# Patient Record
Sex: MALE | Race: WHITE | Employment: OTHER | URBAN - METROPOLITAN AREA
[De-identification: names, ages, dates, MRNs, and addresses within clinical notes are randomized per-mention and may not be internally consistent; named-entity substitution may affect disease eponyms.]

---

## 2018-03-05 ENCOUNTER — HOSPITAL ENCOUNTER (OUTPATIENT)
Dept: PREADMISSION TESTING | Age: 83
Discharge: HOME | End: 2018-03-05
Payer: MEDICARE

## 2018-03-05 LAB
APPEARANCE UR: CLEAR
BACTERIA SPEC CULT: NORMAL
BILIRUB UR QL: NEGATIVE
COLOR UR: YELLOW
GLUCOSE UR STRIP.AUTO-MCNC: NEGATIVE MG/DL
HGB UR QL STRIP: NEGATIVE
KETONES UR QL STRIP.AUTO: NEGATIVE MG/DL
LEUKOCYTE ESTERASE UR QL STRIP.AUTO: NEGATIVE
NITRITE UR QL STRIP.AUTO: NEGATIVE
PH UR STRIP: 7.5 [PH] (ref 5–8)
PROT UR STRIP-MCNC: NEGATIVE MG/DL
SERVICE CMNT-IMP: NORMAL
SP GR UR REFRACTOMETRY: 1.01 (ref 1–1.03)
UROBILINOGEN UR QL STRIP.AUTO: 0.2 EU/DL (ref 0.2–1)

## 2018-03-05 PROCEDURE — 93005 ELECTROCARDIOGRAM TRACING: CPT

## 2018-03-05 PROCEDURE — 81003 URINALYSIS AUTO W/O SCOPE: CPT | Performed by: ORTHOPAEDIC SURGERY

## 2018-03-05 PROCEDURE — 87641 MR-STAPH DNA AMP PROBE: CPT | Performed by: ORTHOPAEDIC SURGERY

## 2018-03-05 RX ORDER — ACETAMINOPHEN 325 MG/1
650 TABLET ORAL
Status: ON HOLD | COMMUNITY
End: 2018-03-22

## 2018-03-05 RX ORDER — CEFAZOLIN SODIUM 2 G/50ML
2 SOLUTION INTRAVENOUS ONCE
Status: CANCELLED | OUTPATIENT
Start: 2018-03-05 | End: 2018-03-05

## 2018-03-05 RX ORDER — CETIRIZINE HCL 10 MG
10 TABLET ORAL
COMMUNITY

## 2018-03-05 NOTE — PERIOP NOTES
No sleep apnea or previous sleep studies. No history of MH. PCP is aware of surgery. Care fusion instructions reviewed and kit given. Does  meet criteria for special population at this time, posting notified. Not participating in clinical trials or research study. Post lung transplant, records from [address]ont are in [address] [place] [address]. Clearances obtained from Pulmonary, Nephrology, and PCP to be faxed from Dr Patricia Holliday office for review.

## 2018-03-06 LAB
ATRIAL RATE: 68 BPM
CALCULATED P AXIS, ECG09: 0 DEGREES
CALCULATED R AXIS, ECG10: 15 DEGREES
CALCULATED T AXIS, ECG11: 89 DEGREES
DIAGNOSIS, 93000: NORMAL
P-R INTERVAL, ECG05: 152 MS
Q-T INTERVAL, ECG07: 394 MS
QRS DURATION, ECG06: 82 MS
QTC CALCULATION (BEZET), ECG08: 418 MS
VENTRICULAR RATE, ECG03: 68 BPM

## 2018-03-16 NOTE — PERIOP NOTES
Eleazar Lopez for update on clearance as noted by anesthesia. (had spoken to anesthesia and Laurel Ramirez this am)  Laurel Ramirez stated Dr. Sheldon Stanley called anesthesia and spoke to Dr. Chantell Turner. She stated it was agreed between Dr. Chantell Turner and Dr. Sheldon Stanley that they had all documentation to proceed.

## 2018-03-18 ENCOUNTER — ANESTHESIA EVENT (OUTPATIENT)
Dept: SURGERY | Age: 83
DRG: 462 | End: 2018-03-18
Payer: MEDICARE

## 2018-03-19 ENCOUNTER — HOSPITAL ENCOUNTER (INPATIENT)
Age: 83
LOS: 3 days | Discharge: HOME | DRG: 462 | End: 2018-03-22
Attending: ORTHOPAEDIC SURGERY | Admitting: ORTHOPAEDIC SURGERY
Payer: MEDICARE

## 2018-03-19 ENCOUNTER — ANESTHESIA (OUTPATIENT)
Dept: SURGERY | Age: 83
DRG: 462 | End: 2018-03-19
Payer: MEDICARE

## 2018-03-19 DIAGNOSIS — M15.9 PRIMARY OSTEOARTHRITIS INVOLVING MULTIPLE JOINTS: Primary | ICD-10-CM

## 2018-03-19 DIAGNOSIS — Z94.2 S/P LUNG TRANSPLANT (HCC): ICD-10-CM

## 2018-03-19 PROBLEM — M17.9 DJD (DEGENERATIVE JOINT DISEASE) OF KNEE: Status: ACTIVE | Noted: 2018-03-19

## 2018-03-19 LAB
ABO + RH BLD: NORMAL
APTT PPP: 26.4 SEC (ref 23–36.4)
BASOPHILS # BLD: 0 K/UL (ref 0–0.06)
BASOPHILS NFR BLD: 1 % (ref 0–2)
BLOOD GROUP ANTIBODIES SERPL: NORMAL
DIFFERENTIAL METHOD BLD: ABNORMAL
EOSINOPHIL # BLD: 0.1 K/UL (ref 0–0.4)
EOSINOPHIL NFR BLD: 1 % (ref 0–5)
ERYTHROCYTE [DISTWIDTH] IN BLOOD BY AUTOMATED COUNT: 17.6 % (ref 11.6–14.5)
HCT VFR BLD AUTO: 30.4 % (ref 36–48)
HGB BLD-MCNC: 9.2 G/DL (ref 13–16)
INR PPP: 1.1 (ref 0.8–1.2)
LYMPHOCYTES # BLD: 0.9 K/UL (ref 0.9–3.6)
LYMPHOCYTES NFR BLD: 11 % (ref 21–52)
MCH RBC QN AUTO: 26.3 PG (ref 24–34)
MCHC RBC AUTO-ENTMCNC: 30.3 G/DL (ref 31–37)
MCV RBC AUTO: 86.9 FL (ref 74–97)
MONOCYTES # BLD: 0.9 K/UL (ref 0.05–1.2)
MONOCYTES NFR BLD: 10 % (ref 3–10)
NEUTS SEG # BLD: 6.9 K/UL (ref 1.8–8)
NEUTS SEG NFR BLD: 77 % (ref 40–73)
PLATELET # BLD AUTO: 191 K/UL (ref 135–420)
PMV BLD AUTO: 11 FL (ref 9.2–11.8)
PROTHROMBIN TIME: 13.6 SEC (ref 11.5–15.2)
RBC # BLD AUTO: 3.5 M/UL (ref 4.7–5.5)
SPECIMEN EXP DATE BLD: NORMAL
WBC # BLD AUTO: 8.8 K/UL (ref 4.6–13.2)

## 2018-03-19 PROCEDURE — 97530 THERAPEUTIC ACTIVITIES: CPT

## 2018-03-19 PROCEDURE — 97162 PT EVAL MOD COMPLEX 30 MIN: CPT

## 2018-03-19 PROCEDURE — 74011250636 HC RX REV CODE- 250/636: Performed by: ORTHOPAEDIC SURGERY

## 2018-03-19 PROCEDURE — 77030003028 HC SUT VCRL J&J -A: Performed by: ORTHOPAEDIC SURGERY

## 2018-03-19 PROCEDURE — 77030014144 HC TY SPN ANES BBMI -B: Performed by: ANESTHESIOLOGY

## 2018-03-19 PROCEDURE — 77030020782 HC GWN BAIR PAWS FLX 3M -B: Performed by: ORTHOPAEDIC SURGERY

## 2018-03-19 PROCEDURE — 85610 PROTHROMBIN TIME: CPT | Performed by: ANESTHESIOLOGY

## 2018-03-19 PROCEDURE — 74011250636 HC RX REV CODE- 250/636

## 2018-03-19 PROCEDURE — 77030005521 HC CATH URETH FOL38 BARD -B: Performed by: ORTHOPAEDIC SURGERY

## 2018-03-19 PROCEDURE — 77030031118: Performed by: ORTHOPAEDIC SURGERY

## 2018-03-19 PROCEDURE — 74011000250 HC RX REV CODE- 250

## 2018-03-19 PROCEDURE — 36415 COLL VENOUS BLD VENIPUNCTURE: CPT | Performed by: ANESTHESIOLOGY

## 2018-03-19 PROCEDURE — C9290 INJ, BUPIVACAINE LIPOSOME: HCPCS | Performed by: ORTHOPAEDIC SURGERY

## 2018-03-19 PROCEDURE — 76010000131 HC OR TIME 2 TO 2.5 HR: Performed by: ORTHOPAEDIC SURGERY

## 2018-03-19 PROCEDURE — 0SRC0J9 REPLACEMENT OF RIGHT KNEE JOINT WITH SYNTHETIC SUBSTITUTE, CEMENTED, OPEN APPROACH: ICD-10-PCS | Performed by: ORTHOPAEDIC SURGERY

## 2018-03-19 PROCEDURE — C1713 ANCHOR/SCREW BN/BN,TIS/BN: HCPCS | Performed by: ORTHOPAEDIC SURGERY

## 2018-03-19 PROCEDURE — 77030036563 HC WRP CLD THER KNE S2SG -B: Performed by: ORTHOPAEDIC SURGERY

## 2018-03-19 PROCEDURE — C1776 JOINT DEVICE (IMPLANTABLE): HCPCS | Performed by: ORTHOPAEDIC SURGERY

## 2018-03-19 PROCEDURE — 77030022295 HC SEAL BPLR VSL DISP MEDT -F: Performed by: ORTHOPAEDIC SURGERY

## 2018-03-19 PROCEDURE — 77030002966 HC SUT PDS J&J -A: Performed by: ORTHOPAEDIC SURGERY

## 2018-03-19 PROCEDURE — 74011250636 HC RX REV CODE- 250/636: Performed by: ANESTHESIOLOGY

## 2018-03-19 PROCEDURE — 77030037400 HC ADH TISS HI VISC EXOFIN CHMP -B: Performed by: ORTHOPAEDIC SURGERY

## 2018-03-19 PROCEDURE — 86900 BLOOD TYPING SEROLOGIC ABO: CPT | Performed by: ORTHOPAEDIC SURGERY

## 2018-03-19 PROCEDURE — 77030020754 HC CUF TRNQT 2BLA STRY -B: Performed by: ORTHOPAEDIC SURGERY

## 2018-03-19 PROCEDURE — 77030018835 HC SOL IRR LR ICUM -A: Performed by: ORTHOPAEDIC SURGERY

## 2018-03-19 PROCEDURE — 65270000029 HC RM PRIVATE

## 2018-03-19 PROCEDURE — 76210000063 HC OR PH I REC FIRST 0.5 HR: Performed by: ORTHOPAEDIC SURGERY

## 2018-03-19 PROCEDURE — 77030038010: Performed by: ORTHOPAEDIC SURGERY

## 2018-03-19 PROCEDURE — 77030011628: Performed by: ORTHOPAEDIC SURGERY

## 2018-03-19 PROCEDURE — 0SRD0J9 REPLACEMENT OF LEFT KNEE JOINT WITH SYNTHETIC SUBSTITUTE, CEMENTED, OPEN APPROACH: ICD-10-PCS | Performed by: ORTHOPAEDIC SURGERY

## 2018-03-19 PROCEDURE — 77030008462 HC STPLR SKN PROX J&J -A: Performed by: ORTHOPAEDIC SURGERY

## 2018-03-19 PROCEDURE — 77030037875 HC DRSG MEPILEX <16IN BORD MOLN -A: Performed by: ORTHOPAEDIC SURGERY

## 2018-03-19 PROCEDURE — 77030034479 HC ADH SKN CLSR PRINEO J&J -B: Performed by: ORTHOPAEDIC SURGERY

## 2018-03-19 PROCEDURE — 77030020813 HC INST SCULP CEM KT DISP S&N -B: Performed by: ORTHOPAEDIC SURGERY

## 2018-03-19 PROCEDURE — 77030018836 HC SOL IRR NACL ICUM -A: Performed by: ORTHOPAEDIC SURGERY

## 2018-03-19 PROCEDURE — 74011000258 HC RX REV CODE- 258: Performed by: ORTHOPAEDIC SURGERY

## 2018-03-19 PROCEDURE — 77030011640 HC PAD GRND REM COVD -A: Performed by: ORTHOPAEDIC SURGERY

## 2018-03-19 PROCEDURE — 77010033678 HC OXYGEN DAILY

## 2018-03-19 PROCEDURE — 74011000250 HC RX REV CODE- 250: Performed by: ORTHOPAEDIC SURGERY

## 2018-03-19 PROCEDURE — 77030027138 HC INCENT SPIROMETER -A: Performed by: ORTHOPAEDIC SURGERY

## 2018-03-19 PROCEDURE — 76060000035 HC ANESTHESIA 2 TO 2.5 HR: Performed by: ORTHOPAEDIC SURGERY

## 2018-03-19 PROCEDURE — 85730 THROMBOPLASTIN TIME PARTIAL: CPT | Performed by: ANESTHESIOLOGY

## 2018-03-19 PROCEDURE — 85025 COMPLETE CBC W/AUTO DIFF WBC: CPT | Performed by: ANESTHESIOLOGY

## 2018-03-19 PROCEDURE — 74011250637 HC RX REV CODE- 250/637: Performed by: ORTHOPAEDIC SURGERY

## 2018-03-19 DEVICE — GENESIS II RESURFACING PATELLAR 35MM
Type: IMPLANTABLE DEVICE | Site: KNEE | Status: FUNCTIONAL
Brand: GENESIS II

## 2018-03-19 DEVICE — CEMENT BNE 20GM HALF DOSE PMMA VISC RADPQ FAST: Type: IMPLANTABLE DEVICE | Status: FUNCTIONAL

## 2018-03-19 DEVICE — JOURNEY PATELLO FEMORAL IMPLANT LG LT
Type: IMPLANTABLE DEVICE | Site: KNEE | Status: FUNCTIONAL
Brand: JOURNEY

## 2018-03-19 DEVICE — JOURNEY PATELLO FEMORAL  IMPLANT                                    LG RT
Type: IMPLANTABLE DEVICE | Site: KNEE | Status: FUNCTIONAL
Brand: JOURNEY

## 2018-03-19 DEVICE — COMPONENT FEM PAT PFJ L W OXINIUM AND RND: Type: IMPLANTABLE DEVICE | Status: FUNCTIONAL

## 2018-03-19 RX ORDER — ONDANSETRON 2 MG/ML
INJECTION INTRAMUSCULAR; INTRAVENOUS AS NEEDED
Status: DISCONTINUED | OUTPATIENT
Start: 2018-03-19 | End: 2018-03-19 | Stop reason: HOSPADM

## 2018-03-19 RX ORDER — DIPHENHYDRAMINE HCL 25 MG
25 CAPSULE ORAL
Status: DISCONTINUED | OUTPATIENT
Start: 2018-03-19 | End: 2018-03-22 | Stop reason: HOSPADM

## 2018-03-19 RX ORDER — SULFAMETHOXAZOLE AND TRIMETHOPRIM 800; 160 MG/1; MG/1
0.5 TABLET ORAL
Status: DISCONTINUED | OUTPATIENT
Start: 2018-03-19 | End: 2018-03-22 | Stop reason: HOSPADM

## 2018-03-19 RX ORDER — SODIUM CHLORIDE 0.9 % (FLUSH) 0.9 %
5-10 SYRINGE (ML) INJECTION EVERY 8 HOURS
Status: DISCONTINUED | OUTPATIENT
Start: 2018-03-19 | End: 2018-03-22 | Stop reason: HOSPADM

## 2018-03-19 RX ORDER — ACETAMINOPHEN 325 MG/1
650 TABLET ORAL
Status: DISCONTINUED | OUTPATIENT
Start: 2018-03-19 | End: 2018-03-22 | Stop reason: HOSPADM

## 2018-03-19 RX ORDER — PREDNISONE 5 MG/1
5 TABLET ORAL DAILY
Status: DISCONTINUED | OUTPATIENT
Start: 2018-03-20 | End: 2018-03-22 | Stop reason: HOSPADM

## 2018-03-19 RX ORDER — CEFAZOLIN SODIUM 2 G/50ML
2 SOLUTION INTRAVENOUS ONCE
Status: COMPLETED | OUTPATIENT
Start: 2018-03-19 | End: 2018-03-19

## 2018-03-19 RX ORDER — TRAMADOL HYDROCHLORIDE 50 MG/1
50 TABLET ORAL
Status: COMPLETED | OUTPATIENT
Start: 2018-03-19 | End: 2018-03-19

## 2018-03-19 RX ORDER — KETAMINE HYDROCHLORIDE 10 MG/ML
INJECTION, SOLUTION INTRAMUSCULAR; INTRAVENOUS AS NEEDED
Status: DISCONTINUED | OUTPATIENT
Start: 2018-03-19 | End: 2018-03-19 | Stop reason: HOSPADM

## 2018-03-19 RX ORDER — ENOXAPARIN SODIUM 100 MG/ML
40 INJECTION SUBCUTANEOUS DAILY
Status: DISCONTINUED | OUTPATIENT
Start: 2018-03-20 | End: 2018-03-20 | Stop reason: DRUGHIGH

## 2018-03-19 RX ORDER — NALOXONE HYDROCHLORIDE 0.4 MG/ML
0.1 INJECTION, SOLUTION INTRAMUSCULAR; INTRAVENOUS; SUBCUTANEOUS AS NEEDED
Status: DISCONTINUED | OUTPATIENT
Start: 2018-03-19 | End: 2018-03-19 | Stop reason: HOSPADM

## 2018-03-19 RX ORDER — SODIUM CHLORIDE 0.9 % (FLUSH) 0.9 %
5-10 SYRINGE (ML) INJECTION AS NEEDED
Status: DISCONTINUED | OUTPATIENT
Start: 2018-03-19 | End: 2018-03-22 | Stop reason: HOSPADM

## 2018-03-19 RX ORDER — FLUMAZENIL 0.1 MG/ML
0.2 INJECTION INTRAVENOUS
Status: DISCONTINUED | OUTPATIENT
Start: 2018-03-19 | End: 2018-03-19 | Stop reason: HOSPADM

## 2018-03-19 RX ORDER — DEXTROSE 50 % IN WATER (D50W) INTRAVENOUS SYRINGE
25-50 AS NEEDED
Status: DISCONTINUED | OUTPATIENT
Start: 2018-03-19 | End: 2018-03-19 | Stop reason: HOSPADM

## 2018-03-19 RX ORDER — MIDAZOLAM HYDROCHLORIDE 1 MG/ML
INJECTION, SOLUTION INTRAMUSCULAR; INTRAVENOUS AS NEEDED
Status: DISCONTINUED | OUTPATIENT
Start: 2018-03-19 | End: 2018-03-19 | Stop reason: HOSPADM

## 2018-03-19 RX ORDER — OXYCODONE HYDROCHLORIDE 5 MG/1
5 TABLET ORAL
Status: DISCONTINUED | OUTPATIENT
Start: 2018-03-19 | End: 2018-03-19

## 2018-03-19 RX ORDER — SODIUM CHLORIDE, SODIUM LACTATE, POTASSIUM CHLORIDE, CALCIUM CHLORIDE 600; 310; 30; 20 MG/100ML; MG/100ML; MG/100ML; MG/100ML
1000 INJECTION, SOLUTION INTRAVENOUS CONTINUOUS
Status: DISCONTINUED | OUTPATIENT
Start: 2018-03-19 | End: 2018-03-19 | Stop reason: HOSPADM

## 2018-03-19 RX ORDER — ATORVASTATIN CALCIUM 20 MG/1
20 TABLET, FILM COATED ORAL DAILY
Status: DISCONTINUED | OUTPATIENT
Start: 2018-03-20 | End: 2018-03-22 | Stop reason: HOSPADM

## 2018-03-19 RX ORDER — LORATADINE 10 MG/1
10 TABLET ORAL
Status: DISCONTINUED | OUTPATIENT
Start: 2018-03-19 | End: 2018-03-22 | Stop reason: HOSPADM

## 2018-03-19 RX ORDER — HYDROMORPHONE HYDROCHLORIDE 2 MG/ML
0.5 INJECTION, SOLUTION INTRAMUSCULAR; INTRAVENOUS; SUBCUTANEOUS
Status: DISCONTINUED | OUTPATIENT
Start: 2018-03-19 | End: 2018-03-19 | Stop reason: HOSPADM

## 2018-03-19 RX ORDER — ONDANSETRON 2 MG/ML
4 INJECTION INTRAMUSCULAR; INTRAVENOUS
Status: DISCONTINUED | OUTPATIENT
Start: 2018-03-19 | End: 2018-03-22 | Stop reason: HOSPADM

## 2018-03-19 RX ORDER — SODIUM CHLORIDE, SODIUM LACTATE, POTASSIUM CHLORIDE, CALCIUM CHLORIDE 600; 310; 30; 20 MG/100ML; MG/100ML; MG/100ML; MG/100ML
25 INJECTION, SOLUTION INTRAVENOUS CONTINUOUS
Status: DISPENSED | OUTPATIENT
Start: 2018-03-19 | End: 2018-03-20

## 2018-03-19 RX ORDER — DEXMEDETOMIDINE HYDROCHLORIDE 4 UG/ML
INJECTION, SOLUTION INTRAVENOUS AS NEEDED
Status: DISCONTINUED | OUTPATIENT
Start: 2018-03-19 | End: 2018-03-19 | Stop reason: HOSPADM

## 2018-03-19 RX ORDER — TRAMADOL HYDROCHLORIDE 50 MG/1
50 TABLET ORAL
Status: DISCONTINUED | OUTPATIENT
Start: 2018-03-19 | End: 2018-03-19

## 2018-03-19 RX ORDER — CEFAZOLIN SODIUM 2 G/50ML
2 SOLUTION INTRAVENOUS EVERY 8 HOURS
Status: COMPLETED | OUTPATIENT
Start: 2018-03-19 | End: 2018-03-20

## 2018-03-19 RX ORDER — PROPOFOL 10 MG/ML
INJECTION, EMULSION INTRAVENOUS
Status: DISCONTINUED | OUTPATIENT
Start: 2018-03-19 | End: 2018-03-19 | Stop reason: HOSPADM

## 2018-03-19 RX ORDER — DIPHENHYDRAMINE HYDROCHLORIDE 50 MG/ML
12.5 INJECTION, SOLUTION INTRAMUSCULAR; INTRAVENOUS
Status: DISCONTINUED | OUTPATIENT
Start: 2018-03-19 | End: 2018-03-22 | Stop reason: HOSPADM

## 2018-03-19 RX ORDER — FERROUS SULFATE, DRIED 160(50) MG
1 TABLET, EXTENDED RELEASE ORAL 2 TIMES DAILY
Status: DISCONTINUED | OUTPATIENT
Start: 2018-03-19 | End: 2018-03-22 | Stop reason: HOSPADM

## 2018-03-19 RX ORDER — PANTOPRAZOLE SODIUM 40 MG/1
40 TABLET, DELAYED RELEASE ORAL DAILY
Status: DISCONTINUED | OUTPATIENT
Start: 2018-03-20 | End: 2018-03-22 | Stop reason: HOSPADM

## 2018-03-19 RX ORDER — BUPIVACAINE HYDROCHLORIDE 7.5 MG/ML
INJECTION, SOLUTION INTRASPINAL AS NEEDED
Status: DISCONTINUED | OUTPATIENT
Start: 2018-03-19 | End: 2018-03-19 | Stop reason: HOSPADM

## 2018-03-19 RX ORDER — ZOLPIDEM TARTRATE 5 MG/1
5 TABLET ORAL
Status: DISCONTINUED | OUTPATIENT
Start: 2018-03-19 | End: 2018-03-22 | Stop reason: HOSPADM

## 2018-03-19 RX ORDER — LIDOCAINE HYDROCHLORIDE 20 MG/ML
INJECTION, SOLUTION EPIDURAL; INFILTRATION; INTRACAUDAL; PERINEURAL AS NEEDED
Status: DISCONTINUED | OUTPATIENT
Start: 2018-03-19 | End: 2018-03-19 | Stop reason: HOSPADM

## 2018-03-19 RX ORDER — INSULIN LISPRO 100 [IU]/ML
INJECTION, SOLUTION INTRAVENOUS; SUBCUTANEOUS ONCE
Status: DISCONTINUED | OUTPATIENT
Start: 2018-03-19 | End: 2018-03-19 | Stop reason: HOSPADM

## 2018-03-19 RX ORDER — PROPRANOLOL HYDROCHLORIDE 20 MG/1
20 TABLET ORAL 2 TIMES DAILY
Status: DISCONTINUED | OUTPATIENT
Start: 2018-03-19 | End: 2018-03-22 | Stop reason: HOSPADM

## 2018-03-19 RX ORDER — SODIUM CHLORIDE 0.9 % (FLUSH) 0.9 %
5-10 SYRINGE (ML) INJECTION AS NEEDED
Status: DISCONTINUED | OUTPATIENT
Start: 2018-03-19 | End: 2018-03-19 | Stop reason: HOSPADM

## 2018-03-19 RX ORDER — NALOXONE HYDROCHLORIDE 0.4 MG/ML
0.4 INJECTION, SOLUTION INTRAMUSCULAR; INTRAVENOUS; SUBCUTANEOUS AS NEEDED
Status: DISCONTINUED | OUTPATIENT
Start: 2018-03-19 | End: 2018-03-22 | Stop reason: HOSPADM

## 2018-03-19 RX ORDER — SODIUM CHLORIDE, SODIUM LACTATE, POTASSIUM CHLORIDE, CALCIUM CHLORIDE 600; 310; 30; 20 MG/100ML; MG/100ML; MG/100ML; MG/100ML
125 INJECTION, SOLUTION INTRAVENOUS CONTINUOUS
Status: DISCONTINUED | OUTPATIENT
Start: 2018-03-19 | End: 2018-03-19

## 2018-03-19 RX ORDER — MAGNESIUM SULFATE 100 %
4 CRYSTALS MISCELLANEOUS AS NEEDED
Status: DISCONTINUED | OUTPATIENT
Start: 2018-03-19 | End: 2018-03-19 | Stop reason: HOSPADM

## 2018-03-19 RX ORDER — TRAMADOL HYDROCHLORIDE 50 MG/1
50-100 TABLET ORAL
Status: DISCONTINUED | OUTPATIENT
Start: 2018-03-19 | End: 2018-03-22 | Stop reason: HOSPADM

## 2018-03-19 RX ORDER — TACROLIMUS 1 MG/1
1 CAPSULE ORAL
Status: DISCONTINUED | OUTPATIENT
Start: 2018-03-20 | End: 2018-03-22 | Stop reason: HOSPADM

## 2018-03-19 RX ORDER — VALGANCICLOVIR 450 MG/1
450 TABLET, FILM COATED ORAL
Status: DISCONTINUED | OUTPATIENT
Start: 2018-03-22 | End: 2018-03-22 | Stop reason: HOSPADM

## 2018-03-19 RX ORDER — LANOLIN ALCOHOL/MO/W.PET/CERES
400 CREAM (GRAM) TOPICAL DAILY
Status: DISCONTINUED | OUTPATIENT
Start: 2018-03-20 | End: 2018-03-22 | Stop reason: HOSPADM

## 2018-03-19 RX ORDER — TACROLIMUS 0.5 MG/1
CAPSULE ORAL EVERY 12 HOURS
Status: CANCELLED | OUTPATIENT
Start: 2018-03-19

## 2018-03-19 RX ORDER — HYDROMORPHONE HYDROCHLORIDE 2 MG/ML
1 INJECTION, SOLUTION INTRAMUSCULAR; INTRAVENOUS; SUBCUTANEOUS
Status: DISCONTINUED | OUTPATIENT
Start: 2018-03-19 | End: 2018-03-22 | Stop reason: HOSPADM

## 2018-03-19 RX ADMIN — PROPRANOLOL HYDROCHLORIDE 20 MG: 20 TABLET ORAL at 20:56

## 2018-03-19 RX ADMIN — ONDANSETRON 4 MG: 2 INJECTION INTRAMUSCULAR; INTRAVENOUS at 13:38

## 2018-03-19 RX ADMIN — CALCIUM CARBONATE-VITAMIN D TAB 500 MG-200 UNIT 1 TABLET: 500-200 TAB at 20:56

## 2018-03-19 RX ADMIN — KETAMINE HYDROCHLORIDE 20 MG: 10 INJECTION, SOLUTION INTRAMUSCULAR; INTRAVENOUS at 13:14

## 2018-03-19 RX ADMIN — MIDAZOLAM HYDROCHLORIDE 0.5 MG: 1 INJECTION, SOLUTION INTRAMUSCULAR; INTRAVENOUS at 12:42

## 2018-03-19 RX ADMIN — MIDAZOLAM HYDROCHLORIDE 1 MG: 1 INJECTION, SOLUTION INTRAMUSCULAR; INTRAVENOUS at 11:53

## 2018-03-19 RX ADMIN — TRAMADOL HYDROCHLORIDE 100 MG: 50 TABLET, FILM COATED ORAL at 22:05

## 2018-03-19 RX ADMIN — TRAMADOL HYDROCHLORIDE 50 MG: 50 TABLET, FILM COATED ORAL at 16:12

## 2018-03-19 RX ADMIN — TRAMADOL HYDROCHLORIDE 50 MG: 50 TABLET, FILM COATED ORAL at 18:07

## 2018-03-19 RX ADMIN — CEFAZOLIN SODIUM 2 G: 2 SOLUTION INTRAVENOUS at 11:59

## 2018-03-19 RX ADMIN — CEFAZOLIN SODIUM 2 G: 2 SOLUTION INTRAVENOUS at 19:49

## 2018-03-19 RX ADMIN — DEXMEDETOMIDINE HYDROCHLORIDE 4 MCG: 4 INJECTION, SOLUTION INTRAVENOUS at 13:12

## 2018-03-19 RX ADMIN — BUPIVACAINE HYDROCHLORIDE 1.5 ML: 7.5 INJECTION, SOLUTION INTRASPINAL at 12:00

## 2018-03-19 RX ADMIN — ACETAMINOPHEN 650 MG: 325 TABLET ORAL at 20:56

## 2018-03-19 RX ADMIN — SODIUM CHLORIDE, SODIUM LACTATE, POTASSIUM CHLORIDE, AND CALCIUM CHLORIDE 125 ML/HR: 600; 310; 30; 20 INJECTION, SOLUTION INTRAVENOUS at 11:36

## 2018-03-19 RX ADMIN — LIDOCAINE HYDROCHLORIDE 20 MG: 20 INJECTION, SOLUTION EPIDURAL; INFILTRATION; INTRACAUDAL; PERINEURAL at 12:13

## 2018-03-19 RX ADMIN — PROPOFOL 100 MCG/KG/MIN: 10 INJECTION, EMULSION INTRAVENOUS at 12:13

## 2018-03-19 RX ADMIN — MIDAZOLAM HYDROCHLORIDE 0.5 MG: 1 INJECTION, SOLUTION INTRAMUSCULAR; INTRAVENOUS at 11:58

## 2018-03-19 RX ADMIN — DEXMEDETOMIDINE HYDROCHLORIDE 8 MCG: 4 INJECTION, SOLUTION INTRAVENOUS at 13:15

## 2018-03-19 RX ADMIN — SODIUM CHLORIDE, SODIUM LACTATE, POTASSIUM CHLORIDE, AND CALCIUM CHLORIDE 50 ML/HR: 600; 310; 30; 20 INJECTION, SOLUTION INTRAVENOUS at 18:08

## 2018-03-19 RX ADMIN — HYDROCORTISONE SODIUM SUCCINATE 250 MG: 250 INJECTION, POWDER, FOR SOLUTION INTRAMUSCULAR; INTRAVENOUS at 11:31

## 2018-03-19 RX ADMIN — SODIUM CHLORIDE, SODIUM LACTATE, POTASSIUM CHLORIDE, AND CALCIUM CHLORIDE 125 ML/HR: 600; 310; 30; 20 INJECTION, SOLUTION INTRAVENOUS at 10:29

## 2018-03-19 NOTE — ANESTHESIA POSTPROCEDURE EVALUATION
Post-Anesthesia Evaluation & Assessment    Visit Vitals    /67    Pulse 62    Temp 36.3 °C (97.4 °F)    Resp 19    Ht 5' 9\" (1.753 m)    Wt 71.7 kg (158 lb)    SpO2 99%    BMI 23.33 kg/m2       No untreated/active PONV    Post-operative hydration adequate. Adequate post-operative analgesia per PACU discharge criteria    Mental status & level of consciousness: alert and oriented x 3    Respiratory status: patent unassisted airway     No apparent anesthetic complications requiring additional post-anesthetic care    Patient has met all discharge requirements.             Preeti Koehler MD

## 2018-03-19 NOTE — IP AVS SNAPSHOT
[address] [address] 
[phone] Patient: Janeen Paez MRN: [id number] A check leola indicates which time of day the medication should be taken. My Medications START taking these medications Instructions Each Dose to Equal  
 Morning Noon Evening Bedtime  
 promethazine 12.5 mg tablet Commonly known as:  PHENERGAN Your last dose was: Your next dose is: Take 2 Tabs by mouth every six (6) hours as needed for Nausea. 25 mg  
    
   
   
   
  
 traMADol 50 mg tablet Commonly known as:  ULTRAM  
   
Your last dose was: Your next dose is: Take 1-2 Tabs by mouth every six (6) hours as needed. Max Daily Amount: 400 mg.  
  mg CHANGE how you take these medications Instructions Each Dose to Equal  
 Morning Noon Evening Bedtime  
 acetaminophen 325 mg tablet Commonly known as:  TYLENOL What changed:   
- when to take this 
- reasons to take this Your last dose was: Your next dose is: Take 2 Tabs by mouth four (4) times daily. 650 mg CONTINUE taking these medications Instructions Each Dose to Equal  
 Morning Noon Evening Bedtime  
 aspirin 81 mg tablet Your last dose was: Your next dose is: Take 81 mg by mouth. 81 mg  
    
   
   
   
  
 calcium-cholecalciferol (D3) tablet Commonly known as:  CALTRATE 600+D Your last dose was: Your next dose is: Take 1 Tab by mouth two (2) times a day. 1 Tab  
    
   
   
   
  
 LIPITOR 20 mg tablet Generic drug:  atorvastatin Your last dose was: Your next dose is: Take 20 mg by mouth daily. 20 mg  
    
   
   
   
  
 magnesium oxide 400 mg tablet Commonly known as:  MAG-OX Your last dose was: Your next dose is: Take 400 mg by mouth daily. 400 mg  
    
   
   
   
  
 multivitamin tablet Commonly known as:  ONE A DAY Your last dose was: Your next dose is: Take 1 Tab by mouth daily. 1 Tab  
    
   
   
   
  
 predniSONE 5 mg tablet Commonly known as:  Natasha Urena Your last dose was: Your next dose is: Take  by mouth daily. 3 tabs every day PROGRAF 1 mg capsule Generic drug:  tacrolimus Your last dose was: Your next dose is: Take  by mouth two (2) times a day. Indications: 1mg in am, 1.5 mg in pm  
     
   
   
   
  
 propranolol 10 mg tablet Commonly known as:  INDERAL Your last dose was: Your next dose is: Take 20 mg by mouth two (2) times a day. 10 mg q am and 20 mg pm  
 20 mg PROTONIX 40 mg tablet Generic drug:  pantoprazole Your last dose was: Your next dose is: Take 40 mg by mouth daily. 40 mg  
    
   
   
   
  
 SEPTRA PO Your last dose was: Your next dose is: Take  by mouth daily. Indications: only on Monday and Friday VALCYTE 450 mg tablet Generic drug:  valGANciclovir Your last dose was: Your next dose is: Take 450 mg by mouth daily. Indications: takes on sunday and thursday 450 mg  
    
   
   
   
  
 ZyrTEC 10 mg tablet Generic drug:  cetirizine Your last dose was: Your next dose is: Take 10 mg by mouth daily as needed for Allergies. 10 mg Where to Get Your Medications Information on where to get these meds will be given to you by the nurse or doctor. ! Ask your nurse or doctor about these medications  
  acetaminophen 325 mg tablet  
 promethazine 12.5 mg tablet  
 traMADol 50 mg tablet

## 2018-03-19 NOTE — PROGRESS NOTES
1957: Assessment completed and documented. Patient alert and oriented x 4, incision to BILATERAL KNEES. ABD PADS dressing clean, dry and intact. Pain 5/10 to bilateral knees on a 0-10/10 numeric scale. Ice packs to site. Patient denies numbness or tingling to bilateral lower and upper extremities. No nausea, no SOB, no distress. Patient educated on IS, and \"up for dinner program\", patient verbalized understanding. Mild tremors, prior to surgery.

## 2018-03-19 NOTE — OP NOTES
[facility] [place]  OPERATIVE REPORT    Roro Kearney  MR#: [id number]  : 1933  ACCOUNT #: [de-identified]   DATE OF SERVICE: 2018    INDICATIONS:  An 43-year-old male with severe debilitating bilateral patellofemoral DJD. PREOPERATIVE DIAGNOSIS:  Bilateral knee degenerative joint disease. POSTOPERATIVE DIAGNOSIS:  Bilateral knee degenerative joint disease. PROCEDURE PERFORMED:  Bilateral knee patellofemoral arthroplasty. SURGEON:  Rashad Mora MD     ASSISTANT:  None. ANESTHESIA:  Spinal.    ANESTHESIOLOGIST:  Dr. Lorena Prader BLOOD LOSS:  25 mL. SPECIMENS REMOVED:  None. COMPLICATIONS:  None. IMPLANTS:  Bilateral Llamas and Nephew patellofemoral arthroplasties. DESCRIPTION OF PROCEDURE:  The patient was brought to the operating room after receiving antibiotics, and in the OR a spinal anesthetic was administered. Tourniquets were placed on both thighs. Both lower extremities were prepped and draped sterilely. After exsanguination, the right tourniquet was inflated to 350 mmHg. Midline incision was made from just above the superior pole of the patella, almost to the tibial tubercle. Flaps were developed. A medial arthrotomy was performed. The patella was everted, and then cut to accept a 35 mm oval patella. Drill holes were placed, and a protective cover was placed onto the patella. At this point, a drill was used to localize the femoral canal.  Off of this, a cutting block was pinned and an anterior cut was made. The femoral trochlear area was sized for a large, a large guide was placed on this, and then a reaming bit was used to remove some of the anterior cortex. At this point, a size large guide was pinned and drill holes were placed. A trial reduction was made with a size large trochlear component, 35 mm oval, resulted in excellent stability. At this point, the long-acting local was injected. Hemostasis was obtained.   Quickset cement was mixed, and the trochlear component followed by the femoral component were cemented. Excess cement was removed and allowed to cure. The knee was put through a full range of motion. The arthrotomy was closed with PDS, the subQ with Vicryl and the skin was closed with a Prineo closure system. A light compressive dressing was applied well. Attention was turned on the left knee. The same steps were utilized in the left knee. The same size components were utilized. Following the procedure and Prineo on the left, the tourniquet was released on the left side. At this point, the patient was in stable condition and went to recovery in stable condition.       Kenney Cables, MD Marthe Cogan / [facility]  D: 03/19/2018 13:39     T: 03/19/2018 16:10  JOB #: [id number]

## 2018-03-19 NOTE — H&P
History and Physical        Patient: Mynor Riley               Sex: male          DOA: 3/19/2018         YOB: 1933      Age:  80 y.o.        LOS:  LOS: 0 days        HPI:     Mynor Riley is a 80 y.o. male who has wilder knee pain has responded to non-op therapy. XR severe DJD patello-fem jt. 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 right leg hx, not officially diagnosed but was treated as if it was a clot       Past Surgical History:   Procedure Laterality Date    ABDOMEN SURGERY PROC UNLISTED  over the years    multiple hernias    CARDIAC SURG PROCEDURE UNLIST  2008    stent placed    CHEST SURGERY PROCEDURE UNLISTED  2011    left lung transplant. Old lung on right side still present    HX TONSILLECTOMY      as a child    HX TRANSPLANT      left lung    [place] COLONOSCOPY W/BIOPSY SINGLE/MULTIPLE  7/29/2013            History reviewed. No pertinent family history. Social History     Social History    Marital status:      Spouse name: N/A    Number of children: N/A    Years of education: N/A     Social History Main Topics    Smoking status: Never Smoker    Smokeless tobacco: Never Used    Alcohol use No    Drug use: No    Sexual activity: Not Asked     Other Topics Concern    None     Social History Narrative       Prior to Admission medications    Medication Sig Start Date End Date Taking? Authorizing Provider   acetaminophen (TYLENOL) 325 mg tablet Take 650 mg by mouth every four (4) hours as needed for Pain. Yes Historical Provider   tacrolimus (PROGRAF) 1 mg capsule Take  by mouth two (2) times a day.  Indications: 1mg in am, 1.5 mg in pm   Yes Historical Provider predniSONE (DELTASONE) 5 mg tablet Take  by mouth daily. 3 tabs every day   Yes Historical Provider   propranolol (INDERAL) 10 mg tablet Take 20 mg by mouth two (2) times a day. 10 mg q am and 20 mg pm    Yes Historical Provider   atorvastatin (LIPITOR) 20 mg tablet Take 20 mg by mouth daily. Yes Historical Provider   valGANciclovir (VALCYTE) 450 mg tablet Take 450 mg by mouth daily. Indications: takes on sunday and thursday   Yes Historical Provider   magnesium oxide (MAG-OX) 400 mg tablet Take 400 mg by mouth daily. Yes Historical Provider   multivitamin (ONE A DAY) tablet Take 1 Tab by mouth daily. Yes Historical Provider   pantoprazole (PROTONIX) 40 mg tablet Take 40 mg by mouth daily. Yes Historical Provider   calcium-cholecalciferol, D3, (CALTRATE 600+D) tablet Take 1 Tab by mouth two (2) times a day. Yes Historical Provider   aspirin 81 mg tablet Take 81 mg by mouth. Yes Historical Provider   cetirizine (ZYRTEC) 10 mg tablet Take 10 mg by mouth daily as needed for Allergies. Historical Provider   SULFAMETHOXAZOLE/TRIMETHOPRIM (SEPTRA PO) Take  by mouth daily. Indications: only on Monday and Friday    Historical Provider       Allergies   Allergen Reactions    Adhesive Other (comments)     blisters    Cefdinir Other (comments)     Felt bad    Levaquin [Levofloxacin] Other (comments)     Fatigue, could not tolerate    Nsaids (Non-Steroidal Anti-Inflammatory Drug) Other (comments)     Due to lung transplant    Other Medication Other (comments)     Posaconsole. Can not remember reaction    Primidone Other (comments)     Feel bad       Review of Systems  Pertinent items are noted in the History of Present Illness.       Physical Exam:      Visit Vitals    /82 (BP 1 Location: Left arm, BP Patient Position: At rest;Sitting;Pre-activity)    Pulse 65    Temp 97.4 °F (36.3 °C)    Resp 16    Ht 5' 9\" (1.753 m)    Wt 71.7 kg (158 lb)    SpO2 98%    BMI 23.33 kg/m2       Physical Exam:  Physical Exam:   General:  Alert, cooperative, no distress, appears stated age. Eyes:  Conjunctivae/corneas clear. PERRL, EOMs intact. Fundi benign   Ears:  Normal TMs and external ear canals both ears. Nose: Nares normal. Septum midline. Mucosa normal. No drainage or sinus tenderness. Mouth/Throat: Lips, mucosa, and tongue normal. Teeth and gums normal.   Neck: Supple, symmetrical, trachea midline, no adenopathy, thyroid: no enlargement/tenderness/nodules, no carotid bruit and no JVD. Back:   Symmetric, no curvature. ROM normal. No CVA tenderness. Lungs:   Clear to auscultation bilaterally. Heart:  Regular rate and rhythm, S1, S2 normal, no murmur, click, rub or gallop. Abdomen:   Soft, non-tender. Bowel sounds normal. No masses,  No organomegaly. Extremities: Extremities normal, atraumatic, no cyanosis or edema. L/R knee pain P-F joint   Pulses: 2+ and symmetric all extremities. Skin: Skin color, texture, turgor normal. No rashes or lesions   Lymph nodes: Cervical, supraclavicular, and axillary nodes normal.   Neurologic: CNII-XII intact. Normal strength, sensation and reflexes throughout. Labs Reviewed: All lab results for the last 24 hours reviewed.     Assessment/Plan     Active Problems:    DJD (degenerative joint disease) of knee (3/19/2018)        L/R knee patellofemoral arthroplasty

## 2018-03-19 NOTE — ANESTHESIA PREPROCEDURE EVALUATION
Anesthetic History   No history of anesthetic complications            Review of Systems / Medical History  Patient summary reviewed, nursing notes reviewed and pertinent labs reviewed    Pulmonary                Comments: S/P lung transplant   Neuro/Psych   Within defined limits           Cardiovascular              CAD    Exercise tolerance: >4 METS  Comments: antirejection meds taken today and stress dose steroids given in preop   GI/Hepatic/Renal         Renal disease: CRI       Endo/Other  Within defined limits        Pertinent negatives: No diabetes, hypothyroidism, hyperthyroidism and obesity   Other Findings            Physical Exam    Airway  Mallampati: III  TM Distance: < 4 cm  Neck ROM: normal range of motion   Mouth opening: Normal     Cardiovascular    Rhythm: regular  Rate: normal         Dental    Dentition: Edentulous     Pulmonary  Breath sounds clear to auscultation               Abdominal  GI exam deferred       Other Findings            Anesthetic Plan    ASA: 4  Anesthesia type: spinal and general - backup          Induction: Intravenous  Anesthetic plan and risks discussed with: Patient      Risks and benefits of epidural include but not limited to a headache (with risk of repair requiring blood patch), backache, bleeding, infection, nerve injury, paralysis, failure with need to replace, aand hemodynamic changes. Patient feels he is optimized for general if needed.   Extreme caution with sterility due to immunosuppression

## 2018-03-19 NOTE — PROGRESS NOTES
Problem: Mobility Impaired (Adult and Pediatric)  Goal: *Acute Goals and Plan of Care (Insert Text)  In 1-7 days pt will be able to perform:  ST.  Bed mobility:  Rolling L to R to L modified independent for positioning. 2.  Supine to sit to supine S with HR for meals. 3.  Sit to stand to sit S with RW in prep for ambulation. LT.  Gait:  Ambulate >150ft S with RW, WBAT, for home/community mobility. 2.  Stair Negotiation:  Ascend/descend >3 steps CGA with HR for home entry. 3.  Activity tolerance: Tolerate up in chair 1-2 hours for ADLs. 4.  Patient/Family Education:  Patient/family to be independent with HEP for follow-up care and safe discharge. physical Therapy EVALUATION    Patient: Samantha Figueroa (35 y.o. male)  Date: 3/19/2018  Primary Diagnosis: CHONDROMALACIA, PATELLA, CAD  DJD (degenerative joint disease) of knee  Procedure(s) (LRB):  BILATERAL PATELLA FEMORAL JOINT ARTHROPLASTY **SPEC POP** (Bilateral) Day of Surgery   Precautions:   Fall, WBAT, Spinal    ASSESSMENT :  Based on the objective data described below, the patient presents with decreased functional mobility and independence in regard to bed mobility, transfers, gt quality and tolerance, B knee AROM, B knee strength, pain, stair negotiation and safety due to recent B UKA surgery and spinal block. Pt rating pain in back 8/10 on numerical pain scale and 0/10 in knees. Pt unable to perform SLR or LAQ. Pt able to transition supine to sit min A and sit EOB x10'. Pt able to scoot along EOB w/ min A blocking B knees for support. Pt unsafe to attempt sit to stand or gt training at this time due to continued spinal effects. Pt returned to sidlying R in bed w/ all needs within reach, SCDs applied and ice pack to B knees. Nurse [facility] aware and family present. Recommend [facility] hospital d/c. Patient will benefit from skilled intervention to address the above impairments.   Patients rehabilitation potential is considered to be Good  Factors which may influence rehabilitation potential include:   []         None noted  []         Mental ability/status  [x]         Medical condition  []         Home/family situation and support systems  []         Safety awareness  [x]         Pain tolerance/management  []         Other:      PLAN :  Recommendations and Planned Interventions:  [x]           Bed Mobility Training             []    Neuromuscular Re-Education  [x]           Transfer Training                   []    Orthotic/Prosthetic Training  [x]           Gait Training                          []    Modalities  [x]           Therapeutic Exercises          []    Edema Management/Control  [x]           Therapeutic Activities            [x]    Patient and Family Training/Education  []           Other (comment):    Frequency/Duration: Patient will be followed by physical therapy twice daily to address goals. Discharge Recommendations: Home Health  Further Equipment Recommendations for Discharge: N/A     SUBJECTIVE:   Patient stated I am hurting in my back. 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    HX TONSILLECTOMY      as a child    HX TRANSPLANT      left lung    [place] COLONOSCOPY W/BIOPSY SINGLE/MULTIPLE  7/29/2013          Barriers to Learning/Limitations: None  Compensate with: visual, verbal, tactile, kinesthetic cues/model  Prior Level of Function/Home Situation:   Home Situation  Home Environment: Private residence  # Steps to Enter: 4  Rails to Enter: Yes  Hand Rails : Bilateral  One/Two Story Residence: Two story, live on 1st floor  # of Interior Steps: 24  Height of Each Step (in): 8 inches  Interior Rails: Both  Lift Chair Available: No  Living Alone: No  Support Systems: Child(jose), Friends \ neighbors  Patient Expects to be Discharged to[de-identified] Private residence  Current DME Used/Available at Home: jonathan Luis, Walker, rolling  Critical Behavior:  Neurologic State: Alert; Appropriate for age  Orientation Level: Oriented X4  Cognition: Appropriate decision making; Appropriate for age attention/concentration; Appropriate safety awareness; Follows commands  Safety/Judgement: Awareness of environment  Psychosocial  Patient Behaviors: Calm; Cooperative  Family  Behaviors: Hyper-vigilant; Cooperative  Skin Condition/Temp: Dry;Warm  Family  Behaviors: Hyper-vigilant; Cooperative  Skin Integrity: Incision (comment) (B knees)  Skin Integumentary  Skin Color: Appropriate for ethnicity  Skin Condition/Temp: Dry;Warm  Skin Integrity: Incision (comment) (B knees)  Varicosities: Present  Strength:    Strength: Generally decreased, functional  Tone & Sensation:   Tone: Normal  Sensation: Impaired  Range Of Motion:  AROM: Generally decreased, functional  Functional Mobility:  Bed Mobility:  Rolling: Minimum assistance; Additional time (vc)  Supine to Sit: Minimum assistance (vc)  Sit to Supine: Minimum assistance (vc)  Scooting: Minimum assistance (vc)  Transfers:  Balance:   Sitting: Impaired; With support  Sitting - Static: Good (unsupported)  Sitting - Dynamic: Fair (occasional)  Ambulation/Gait Training:  Right Side Weight Bearing: As tolerated  Left Side Weight Bearing: As tolerated  Interventions: Safety awareness training; Tactile cues; Verbal cues; Visual/Demos  Therapeutic Exercises:   HEP written copy issued to pt per MD protocol. Pain:  Pain Scale 1: Numeric (0 - 10)  Pain Intensity 1: 8  Pain Location 1: Back  Pain Orientation 1: Lower  Pain Description 1: Aching  Pain Intervention(s) 1: Medication (see MAR)  Activity Tolerance:   Fair   Please refer to the flowsheet for vital signs taken during this treatment. After treatment:   []         Patient left in no apparent distress sitting up in chair  [x]         Patient left in no apparent distress in bed  [x]         Call bell left within reach  [x]         Nursing notified  [x]         Family present  []         Bed alarm activated    COMMUNICATION/EDUCATION:   [x]         Fall prevention education was provided and the patient/caregiver indicated understanding. [x]         Patient/family have participated as able in goal setting and plan of care. [x]         Patient/family agree to work toward stated goals and plan of care. []         Patient understands intent and goals of therapy, but is neutral about his/her participation. []         Patient is unable to participate in goal setting and plan of care. Thank you for this referral.  Rhonda Llanes, PT   Time Calculation: 27 mins    Eval Complexity: History: HIGH Complexity :3+ comorbidities / personal factors will impact the outcome/ POC Exam:MEDIUM Complexity : 3 Standardized tests and measures addressing body structure, function, activity limitation and / or participation in recreation  Presentation: MEDIUM Complexity : Evolving with changing characteristics  Clinical Decision Making:High Complexity unable to stand Overall Complexity:MEDIUM     Mobility  Current  CJ= 20-39%   Goal  CI= 1-19%. The severity rating is based on the Level of Assistance required for Functional Mobility and ADLs.

## 2018-03-19 NOTE — PERIOP NOTES
TRANSFER - OUT REPORT:    Verbal report given to Sarmad Molina RN (name) on Keenan Olmos  being transferred to  (unit) for routine progression of care       Report consisted of patients Situation, Background, Assessment and   Recommendations(SBAR). Information from the following report(s) SBAR, Kardex, OR Summary, Procedure Summary, Intake/Output, MAR and Recent Results was reviewed with the receiving nurse. Lines:   Peripheral IV 03/19/18 Left Forearm (Active)   Site Assessment Clean, dry, & intact 3/19/2018  2:23 PM   Phlebitis Assessment 0 3/19/2018  2:23 PM   Infiltration Assessment 0 3/19/2018  2:23 PM   Dressing Status Clean, dry, & intact 3/19/2018  2:23 PM   Dressing Type Transparent 3/19/2018  2:23 PM   Hub Color/Line Status Green; Infusing 3/19/2018  2:23 PM   Alcohol Cap Used No 3/19/2018 10:28 AM        Opportunity for questions and clarification was provided.       Patient transported with:   O2 @ 2 liters  Registered Nurse  [facility]

## 2018-03-19 NOTE — IP AVS SNAPSHOT
[address] [address] 
[phone] Patient: Claudean Carrier MRN: [id number] About your hospitalization You were admitted on:  March 19, 2018 You last received care in the:  THE [facility] [address] You were discharged on:  March 22, 2018 Why you were hospitalized Your primary diagnosis was:  Not on File Your diagnoses also included:  Djd (Degenerative Joint Disease) Of Knee, Cad (Coronary Artery Disease), Chronic Kidney Disease, S/P Lung Transplant (Hcc) Follow-up Information Follow up With Details Comments Contact Info Oliver Horner MD On 3/29/2018 Follow up appointment @ 10:15am [address] [place] [address] 
[phone] [address] [place] [address] to continue managing your healthcare needs. [phone] MD Javier Rowley [facility] [phone] 421 [facility] [address] [phone] Discharge Orders None A check leola indicates which time of day the medication should be taken. My Medications START taking these medications Instructions Each Dose to Equal  
 Morning Noon Evening Bedtime  
 promethazine 12.5 mg tablet Commonly known as:  PHENERGAN Your last dose was: Your next dose is: Take 2 Tabs by mouth every six (6) hours as needed for Nausea. 25 mg  
    
   
   
   
  
 traMADol 50 mg tablet Commonly known as:  ULTRAM  
   
Your last dose was: Your next dose is: Take 1-2 Tabs by mouth every six (6) hours as needed. Max Daily Amount: 400 mg.  
  mg CHANGE how you take these medications Instructions Each Dose to Equal  
 Morning Noon Evening Bedtime  
 acetaminophen 325 mg tablet Commonly known as:  TYLENOL What changed:   
- when to take this 
- reasons to take this Your last dose was: Your next dose is: Take 2 Tabs by mouth four (4) times daily. 650 mg CONTINUE taking these medications Instructions Each Dose to Equal  
 Morning Noon Evening Bedtime  
 aspirin 81 mg tablet Your last dose was: Your next dose is: Take 81 mg by mouth. 81 mg  
    
   
   
   
  
 calcium-cholecalciferol (D3) tablet Commonly known as:  CALTRATE 600+D Your last dose was: Your next dose is: Take 1 Tab by mouth two (2) times a day. 1 Tab  
    
   
   
   
  
 LIPITOR 20 mg tablet Generic drug:  atorvastatin Your last dose was: Your next dose is: Take 20 mg by mouth daily. 20 mg  
    
   
   
   
  
 magnesium oxide 400 mg tablet Commonly known as:  MAG-OX Your last dose was: Your next dose is: Take 400 mg by mouth daily. 400 mg  
    
   
   
   
  
 multivitamin tablet Commonly known as:  ONE A DAY Your last dose was: Your next dose is: Take 1 Tab by mouth daily. 1 Tab  
    
   
   
   
  
 predniSONE 5 mg tablet Commonly known as:  Scott Valenzuela Your last dose was: Your next dose is: Take  by mouth daily. 3 tabs every day PROGRAF 1 mg capsule Generic drug:  tacrolimus Your last dose was: Your next dose is: Take  by mouth two (2) times a day. Indications: 1mg in am, 1.5 mg in pm  
     
   
   
   
  
 propranolol 10 mg tablet Commonly known as:  INDERAL Your last dose was: Your next dose is: Take 20 mg by mouth two (2) times a day. 10 mg q am and 20 mg pm  
 20 mg PROTONIX 40 mg tablet Generic drug:  pantoprazole Your last dose was: Your next dose is: Take 40 mg by mouth daily. 40 mg  
    
   
   
   
  
 SEPTRA PO Your last dose was: Your next dose is: Take  by mouth daily. Indications: only on Monday and Friday VALCYTE 450 mg tablet Generic drug:  valGANciclovir Your last dose was: Your next dose is: Take 450 mg by mouth daily. Indications: takes on sunday and thursday 450 mg  
    
   
   
   
  
 ZyrTEC 10 mg tablet Generic drug:  cetirizine Your last dose was: Your next dose is: Take 10 mg by mouth daily as needed for Allergies. 10 mg Where to Get Your Medications Information on where to get these meds will be given to you by the nurse or doctor. ! Ask your nurse or doctor about these medications  
  acetaminophen 325 mg tablet  
 promethazine 12.5 mg tablet  
 traMADol 50 mg tablet Discharge Instructions DISCHARGE SUMMARY from Nurse PATIENT INSTRUCTIONS: 
 
After general anesthesia or intravenous sedation, for 24 hours or while taking prescription Narcotics: · Limit your activities Total Knee Replacement: What to Expect at Home Your Recovery When you leave the hospital, you should be able to move around with a walker or crutches. But you will need someone to help you at home for the next few weeks or until you have more energy and can move around better. If there is no one to help you at home, you may go to a rehabilitation center. You will go home with a bandage and stitches or staples. Change the bandage as your doctor tells you to. Your doctor will remove your stitches or staples 10 to 21 days after your surgery. You may still have some mild pain, and the area may be swollen for 3 to 6 months after surgery. Your knee will continue to improve for 6 to 12 months. You will probably use a walker for 1 to 3 weeks and then use crutches. When you are ready, you can use a cane. You will probably be able to walk on your own in 4 to 8 weeks. You will need to do months of physical rehabilitation (rehab) after a knee replacement. Rehab will help you strengthen the muscles of the knee and help you regain movement. After you recover, your artificial knee will allow you to do normal daily activities with less pain or no pain at all. You may be able to hike, dance, ride a bike, and play golf. Talk to your doctor about whether you can do more strenuous activities. Always tell your caregivers that you have an artificial knee. How long it will take to walk on your own, return to normal activities, and go back to work depends on your health and how well your rehabilitation (rehab) program goes. The better you do with your rehab exercises, the quicker you will get your strength and movement back. This care sheet gives you a general idea about how long it will take for you to recover. But each person recovers at a different pace. Follow the steps below to get better as quickly as possible. How can you care for yourself at home? Activity ? Rest when you feel tired. You may take a nap, but do not stay in bed all day. When you sit, use a chair with arms. You can use the arms to help you stand up. ? Work with your physical therapist to find the best way to exercise. You may be able to take frequent, short walks using crutches or a walker. What you can do as your knee heals will depend on whether your new knee is cemented or uncemented. You may not be able to do certain things for a while if your new knee is uncemented. ? After your knee has healed enough, you can do more strenuous activities with caution. You can golf, but use a golf cart, and do not wear shoes with spikes. You can bike on a flat road or on a stationary bike. Avoid biking up hills. Your doctor may suggest that you stay away from activities that put stress on your knee.  These include tennis or badminton, squash or racquetball, contact sports like football, jumping (such as in basketball), jogging, or running. Avoid activities where you might fall. These include horseback riding, skiing, and mountain biking. ? Do not sit for more than 1 hour at a time. Get up and walk around for a while before you sit again. If you must sit for a long time, prop up your leg with a chair or footstool. This will help you avoid swelling. ? Ask your doctor when you can shower. You may need to take sponge baths until your stitches or staples have been removed. ? Ask your doctor when you can drive again. It may take up to 8 weeks after knee replacement surgery before it is safe for you to drive. ? When you get into a car, sit on the edge of the seat. Then pull in your legs, and turn to face the front. ? You should be able to do many everyday activities 3 to 6 weeks after your surgery. You will probably need to take 4 to 16 weeks off from work. When you can go back to work depends on the type of work you do and how you feel. ? Ask your doctor when it is okay for you to have sex. ? Do not lift anything heavier than 10 pounds and do not lift weights for 12 weeks. Diet ? By the time you leave the hospital, you should be eating your normal diet. If your stomach is upset, try bland, low-fat foods like plain rice, broiled chicken, toast, and yogurt. Your doctor may suggest that you take iron and vitamin supplements. ? Drink plenty of fluids (unless your doctor tells you not to). ? Eat healthy foods, and watch your portion sizes. Try to stay at your ideal weight. Too much weight puts more stress on your new knee. ? You may notice that your bowel movements are not regular right after your surgery. This is common. Try to avoid constipation and straining with bowel movements. You may want to take a fiber supplement every day. If you have not had a bowel movement after a couple of days, ask your doctor about taking a mild laxative. Medicines ? Your doctor will tell you if and when you can restart your medicines. He or she will also give you instructions about taking any new medicines. ? If you take blood thinners, such as warfarin (Coumadin), clopidogrel (Plavix), or aspirin, be sure to talk to your doctor. He or she will tell you if and when to start taking those medicines again. Make sure that you understand exactly what your doctor wants you to do.  
? Your doctor may give you a blood-thinning medicine to prevent blood clots. If you take a blood thinner, be sure you get instructions about how to take your medicine safely. Blood thinners can cause serious bleeding problems. This medicine could be in pill form or as a shot (injection). If a shot is necessary, your doctor will tell you how to do this. ? Be safe with medicines. Take pain medicines exactly as directed. If the doctor gave you a prescription medicine for pain, take it as prescribed. If you are not taking a prescription pain medicine, ask your doctor if you can take an over-the-counter medicine. Plan to take your pain medicine 30 minutes before exercises. It is easier to prevent pain before it starts than to stop it once it has started. ? If you think your pain medicine is making you sick to your stomach: Take your medicine after meals (unless your doctor has told you not to). Ask your doctor for a different pain medicine. ? If your doctor prescribed antibiotics, take them as directed. Do not stop taking them just because you feel better. You need to take the full course of antibiotics. Incision care ? You will have a bandage over the cut (incision) and staples or stitches. Take the bandage off when your doctor says it is okay. ? Your doctor will remove the staples or stitches 10 days to 3 weeks after the surgery and replace them with strips of tape. Leave the tape on for a week or until it falls off. Exercise ? Your rehab program will give you a number of exercises to do to help you get back your knee's range of motion and strength. Always do them as your therapist tells you. Ice and elevation ? For pain and swelling, put ice or a cold pack on the area for 10 to 20 minutes at a time. Put a thin cloth between the ice and your skin. Other instructions ? Continue to wear your support stockings as your doctor says. These help to prevent blood clots. The length of time that you will have to wear them depends on your activity level and the amount of swelling. ? You have metal pieces in your knee. These may set off some airport metal detectors. Carry a medical alert card that says you have an artificial joint, just in case. Follow-up care is a key part of your treatment and safety. Be sure to make and go to all appointments, and call your doctor if you are having problems. It's also a good idea to know your test results and keep a list of the medicines you take. When should you call for help? Call 911 anytime you think you may need emergency care. For example, call if: 
? You passed out (lost consciousness). ? You have severe trouble breathing. ? You have sudden chest pain and shortness of breath, or you cough up blood. ?Call your doctor now or seek immediate medical care if: 
? You have signs of infection, such as: Increased pain, swelling, warmth, or redness. Red streaks leading from the incision. Pus draining from the incision. A fever. ? You have signs of a blood clot, such as: 
Pain in your calf, back of the knee, thigh, or groin. Redness and swelling in your leg or groin. ? Your incision comes open and begins to bleed, or the bleeding increases. ? You have pain that does not get better after you take pain medicine. ? Watch closely for changes in your health, and be sure to contact your doctor if: 
? You do not have a bowel movement after taking a laxative. Where can you learn more? Go to http://scout-navid.info/. Enter R969 in the search box to learn more about \"Total Knee Replacement: What to Expect at Home. \" Current as of: March 21, 2017 Content Version: 11.4 © 5994-2737 [employer]. Care instructions adapted under license by Robert Applebaum MD (which disclaims liability or warranty for this information). If you have questions about a medical condition or this instruction, always ask your healthcare professional. [address] any warranty or liability for your use of this information. ·  
· Do not drive and operate hazardous machinery · Do not make important personal or business decisions · Do  not drink alcoholic beverages · If you have not urinated within 8 hours after discharge, please contact your surgeon on call. Report the following to your surgeon: 
· Excessive pain, swelling, redness or odor of or around the surgical area · Temperature over 100.5 · Nausea and vomiting lasting longer than 4 hours or if unable to take medications · Any signs of decreased circulation or nerve impairment to extremity: change in color, persistent  numbness, tingling, coldness or increase pain · Any questions What to do at Home: 
Recommended activity: {discharge activity:56392}, *** If you experience any of the following symptoms ***, please follow up with ***. *  Please give a list of your current medications to your Primary Care Provider. *  Please update this list whenever your medications are discontinued, doses are 
    changed, or new medications (including over-the-counter products) are added. *  Please carry medication information at all times in case of emergency situations. These are general instructions for a healthy lifestyle: No smoking/ No tobacco products/ Avoid exposure to second hand smoke Surgeon General's Warning:  Quitting smoking now greatly reduces serious risk to your health. Obesity, smoking, and sedentary lifestyle greatly increases your risk for illness A healthy diet, regular physical exercise & weight monitoring are important for maintaining a healthy lifestyle You may be retaining fluid if you have a history of heart failure or if you experience any of the following symptoms:  Weight gain of 3 pounds or more overnight or 5 pounds in a week, increased swelling in our hands or feet or shortness of breath while lying flat in bed. Please call your doctor as soon as you notice any of these symptoms; do not wait until your next office visit. Recognize signs and symptoms of STROKE: 
 
F-face looks uneven A-arms unable to move or move unevenly S-speech slurred or non-existent T-time-call 911 as soon as signs and symptoms begin-DO NOT go Back to bed or wait to see if you get better-TIME IS BRAIN. Warning Signs of HEART ATTACK Call 911 if you have these symptoms: 
? Chest discomfort. Most heart attacks involve discomfort in the center of the chest that lasts more than a few minutes, or that goes away and comes back. It can feel like uncomfortable pressure, squeezing, fullness, or pain. ? Discomfort in other areas of the upper body. Symptoms can include pain or discomfort in one or both arms, the back, neck, jaw, or stomach. ? Shortness of breath with or without chest discomfort. ? Other signs may include breaking out in a cold sweat, nausea, or lightheadedness. Don't wait more than five minutes to call [address]! Fast action can save your life. Calling 911 is almost always the fastest way to get lifesaving treatment. Emergency Medical Services staff can begin treatment when they arrive  up to an hour sooner than if someone gets to the hospital by car. The discharge information has been reviewed with the {PATIENT PARENT GUARDIAN:85598}. The {PATIENT PARENT GUARDIAN:44749} verbalized understanding. Discharge medications reviewed with the {Dishcarolvin meds reviewed AOIV:83472} and appropriate educational materials and side effects teaching were provided. ___________________________________________________________________________________________________________________________________ MyChart Announcement We are excited to announce that we are making your provider's discharge notes available to you in [employer]. You will see these notes when they are completed and signed by the physician that discharged you from your recent hospital stay. If you have any questions or concerns about any information you see in [employer], please call the Health Information Department where you were seen or reach out to your Primary Care Provider for more information about your plan of care. Introducing [facility]! Elmo Lindo introduces [employer] patient portal. Now you can access parts of your medical record, email your doctor's office, and request medication refills online. 1. In your internet browser, go to [email or web address] 2. Click on the First Time User? Click Here link in the Sign In box. You will see the New Member Sign Up page. 3. Enter your [employer] Access Code exactly as it appears below. You will not need to use this code after youve completed the sign-up process. If you do not sign up before the expiration date, you must request a new code. · [employer] Access Code: [email or web address] Expires: 3/26/2018 12:50 PM 
 
4. Enter the last four digits of your Social Security Number (xxxx) and Date of Birth (mm/dd/yyyy) as indicated and click Submit. You will be taken to the next sign-up page. 5. Create a [employer] ID. This will be your MyChart login ID and cannot be changed, so think of one that is secure and easy to remember. 6. Create a [employer] password. You can change your password at any time. 7. Enter your Password Reset Question and Answer.  This can be used at a later time if you forget your password. 8. Enter your e-mail address. You will receive e-mail notification when new information is available in [address] 9. Click Sign Up. You can now view and download portions of your medical record. 10. Click the Download Summary menu link to download a portable copy of your medical information. If you have questions, please visit the Frequently Asked Questions section of the MyChart website. Remember, [employer] is NOT to be used for urgent needs. For medical emergencies, dial 911. Now available from your iPhone and Android! Providers Seen During Your Hospitalization Provider Specialty Primary office phone Angie Camp MD Orthopedic Surgery [phone] Your Primary Care Physician (PCP) Primary Care Physician Office Phone Office Fax Sherri Zarina [phone] You are allergic to the following Allergen Reactions Adhesive Other (comments)  
 blisters Cefdinir Other (comments) Felt bad Levaquin (Levofloxacin) Other (comments) Fatigue, could not tolerate Nsaids (Non-Steroidal Anti-Inflammatory Drug) Other (comments) Due to lung transplant Other Medication Other (comments) Posaconsole. Can not remember reaction Primidone Other (comments) Feel bad Recent Documentation Height Weight BMI Smoking Status 1.753 m 71.7 kg 23.33 kg/m2 Never Smoker Emergency Contacts Name Discharge Info Relation Home Work Mobile Serena Albert DISCHARGE CAREGIVER [3] Spouse [3]   [phone] Patient Belongings The following personal items are in your possession at time of discharge: 
  Dental Appliances: Lowers, Uppers  Visual Aid: Glasses, With patient, At bedside      Home Medications: Sent to pharmacy (for verification, pt to take own Prograft. )   Jewelry: None  Clothing:  At bedside    Other Valuables: Cell Phone  Personal Items Sent to Safe:  (none) Please provide this summary of care documentation to your next provider. Signatures-by signing, you are acknowledging that this After Visit Summary has been reviewed with you and you have received a copy. Patient Signature:  ____________________________________________________________ Date:  ____________________________________________________________  
  
[facility] Kuldip Provider Signature:  ____________________________________________________________ Date:  ____________________________________________________________

## 2018-03-19 NOTE — PROGRESS NOTES
1542  Received client from PACU in satisfactory condition. Client is a pt of Dr. Bebe Vargas. Pt had a Bilateral Patella femoral joint Arthroplasty today. Client is A/O X 4. Client is calm and cooperative. Clients PERRLA. Denies numbness or tingling to any extremity. With + Posterior Tibial and Dorsalis Pedis pulses. Capillary Refill less than 3 seconds. Skin is warm , dry and skin color is appropriate to race. Client is negative for JVD. Bibasilar breath sounds clear. No use of accessory muscles. Bowel sounds hypoactive to all quadrants. Abdomen is soft and non-tender. Client has not voided post-operatively. No bladder distention evident. No complaints of bladder discomfort. Client has  ABD pads to both Knees held in place by TEDS. Dressings are dry and intact. No other skin integrity issues present. Sequential compression device applied. Client has LH18 gauge PIV present and running LR at 50 ml/hr. Clients pain is 4/10 on 0-10 scale to both knees but  Has severe pain on lower back  with pain level 8-9/10 below spinal anesthesia injection site. . Client oriented to call bell use as well as bed use. Client oriented to phone and how to order meals. Call bell within reach. Bed in low position. Three side rails up. 1612  Medicated with Ultram 50 mg po for pain level 9/10 on lower back below spinal cath inj site. 5:08 PM   Pain level 7/10. Seen by PT. Sat sat at edge of bed for few minutes and leg exercises then back in bed. 10[address]  Dr Elizabeth Graves was notified of of Hospitalist consult. 5:45 PM   Pt 's back pain is 8/10. Pt refused oxycodone. Wants Ultram dose to be increased to 2 tabs. Dr Rajni Wright was paged. Diana Hernandez ordered tramadol 50 mg po now then increase Ultram to 1-2 tabs po q 6 hrs.    1807  Tramadol 50 mg po x 1 given for pain level 8-9/10. Seen by Dr Elizabeth Garves. 1900  Pt was complaining of nausea as per Pt's wife. Order obtained from Dr Rajni Wright for Zofran 4 mg IV q 6 hrs .   1915  Pt  No longer has nausea and does not want zofran at his time. Pt voided 2x without difficulty.

## 2018-03-19 NOTE — ANESTHESIA PROCEDURE NOTES
Spinal Block    Start time: 3/19/2018 11:56 AM  End time: 3/19/2018 12:02 PM  Performed by: Ty Fall by: Shanice Peak:   Indications: primary anesthetic  Preanesthetic Checklist: patient identified, risks and benefits discussed, anesthesia consent, site marked, patient being monitored and timeout performed      Spinal Block:   Patient Position:  Seated  Prep Region:  Lumbar  Prep: Betadine and patient draped      Location:  L3-4  Technique:  Single shot  Local:  Lidocaine 1%  Local Dose (mL):  3    Needle:   Needle Type:  Pencil-tip  Needle Gauge:  25 G  Attempts:  1      Events: CSF confirmed, no blood with aspiration and no paresthesia        Assessment:  Insertion:  Uncomplicated  Patient tolerance:  Patient tolerated the procedure well with no immediate complications  bupivicaine

## 2018-03-19 NOTE — BRIEF OP NOTE
BRIEF OPERATIVE NOTE    Date of Procedure: 3/19/2018   Preoperative Diagnosis:L/R knee DJD  Postoperative Diagnosis:same    Procedure(s):  BILATERAL PATELLA FEMORAL JOINT ARTHROPLASTY **SPEC POP**  Surgeon(s) and Role:     * Brea Hester MD - Primary         Assistant Staff: None      Surgical Staff:  * No surgical staff found *  No case tracking events are documented in the log.   Anesthesia: General   Estimated Blood Loss: 25  Specimens: * No specimens in log *   Findings: djd   Complications: none  Implants: * No implants in log *

## 2018-03-19 NOTE — PERIOP NOTES
TRANSFER - IN REPORT:    Verbal report received from ORN and CRNA (name) on Viki Vicente  being received from OR (unit) for routine progression of care      Report consisted of patients Situation, Background, Assessment and   Recommendations(SBAR). Information from the following report(s) SBAR, Kardex, OR Summary, Procedure Summary, Intake/Output, MAR and Recent Results was reviewed with the receiving nurse. Opportunity for questions and clarification was provided. Assessment completed upon patients arrival to unit and care assumed.

## 2018-03-19 NOTE — ROUTINE PROCESS
1525  TRANSFER - IN REPORT:    Verbal report received from TATIANA Garcia RN(name) on General Huerta  being received from [facility]) for routine post - op      Report consisted of patients Situation, Background, Assessment and   Recommendations(SBAR). Information from the following report(s) SBAR, Kardex and MAR was reviewed with the receiving nurse. Opportunity for questions and clarification was provided. Assessment completed upon patients arrival to unit and care assumed.

## 2018-03-20 LAB
ANION GAP SERPL CALC-SCNC: 10 MMOL/L (ref 3–18)
BUN SERPL-MCNC: 36 MG/DL (ref 7–18)
BUN/CREAT SERPL: 17 (ref 12–20)
CALCIUM SERPL-MCNC: 8.6 MG/DL (ref 8.5–10.1)
CHLORIDE SERPL-SCNC: 105 MMOL/L (ref 100–108)
CO2 SERPL-SCNC: 24 MMOL/L (ref 21–32)
CREAT SERPL-MCNC: 2.16 MG/DL (ref 0.6–1.3)
GLUCOSE SERPL-MCNC: 143 MG/DL (ref 74–99)
HCT VFR BLD AUTO: 23.2 % (ref 36–48)
HGB BLD-MCNC: 7.1 G/DL (ref 13–16)
POTASSIUM SERPL-SCNC: 5.1 MMOL/L (ref 3.5–5.5)
SODIUM SERPL-SCNC: 139 MMOL/L (ref 136–145)

## 2018-03-20 PROCEDURE — 74011636637 HC RX REV CODE- 636/637: Performed by: ORTHOPAEDIC SURGERY

## 2018-03-20 PROCEDURE — 97530 THERAPEUTIC ACTIVITIES: CPT

## 2018-03-20 PROCEDURE — 97116 GAIT TRAINING THERAPY: CPT

## 2018-03-20 PROCEDURE — 97110 THERAPEUTIC EXERCISES: CPT

## 2018-03-20 PROCEDURE — 36415 COLL VENOUS BLD VENIPUNCTURE: CPT | Performed by: ORTHOPAEDIC SURGERY

## 2018-03-20 PROCEDURE — 74011250637 HC RX REV CODE- 250/637: Performed by: ORTHOPAEDIC SURGERY

## 2018-03-20 PROCEDURE — 51798 US URINE CAPACITY MEASURE: CPT

## 2018-03-20 PROCEDURE — 97165 OT EVAL LOW COMPLEX 30 MIN: CPT

## 2018-03-20 PROCEDURE — 85018 HEMOGLOBIN: CPT | Performed by: ORTHOPAEDIC SURGERY

## 2018-03-20 PROCEDURE — 74011250636 HC RX REV CODE- 250/636: Performed by: ORTHOPAEDIC SURGERY

## 2018-03-20 PROCEDURE — 80048 BASIC METABOLIC PNL TOTAL CA: CPT | Performed by: ORTHOPAEDIC SURGERY

## 2018-03-20 PROCEDURE — 65270000029 HC RM PRIVATE

## 2018-03-20 RX ORDER — ENOXAPARIN SODIUM 100 MG/ML
30 INJECTION SUBCUTANEOUS EVERY 24 HOURS
Status: DISCONTINUED | OUTPATIENT
Start: 2018-03-21 | End: 2018-03-22 | Stop reason: HOSPADM

## 2018-03-20 RX ADMIN — PREDNISONE 5 MG: 5 TABLET ORAL at 09:36

## 2018-03-20 RX ADMIN — ACETAMINOPHEN 650 MG: 325 TABLET ORAL at 00:59

## 2018-03-20 RX ADMIN — TACROLIMUS 1 MG: 1 CAPSULE ORAL at 09:36

## 2018-03-20 RX ADMIN — MULTIPLE VITAMINS W/ MINERALS TAB 1 TABLET: TAB at 09:36

## 2018-03-20 RX ADMIN — Medication 400 MG: at 09:36

## 2018-03-20 RX ADMIN — PROPRANOLOL HYDROCHLORIDE 20 MG: 20 TABLET ORAL at 23:17

## 2018-03-20 RX ADMIN — CEFAZOLIN SODIUM 2 G: 2 SOLUTION INTRAVENOUS at 03:54

## 2018-03-20 RX ADMIN — PANTOPRAZOLE SODIUM 40 MG: 40 TABLET, DELAYED RELEASE ORAL at 09:36

## 2018-03-20 RX ADMIN — ATORVASTATIN CALCIUM 20 MG: 20 TABLET, FILM COATED ORAL at 09:36

## 2018-03-20 RX ADMIN — ENOXAPARIN SODIUM 40 MG: 40 INJECTION SUBCUTANEOUS at 00:59

## 2018-03-20 RX ADMIN — PROPRANOLOL HYDROCHLORIDE 20 MG: 20 TABLET ORAL at 09:36

## 2018-03-20 RX ADMIN — CALCIUM CARBONATE-VITAMIN D TAB 500 MG-200 UNIT 1 TABLET: 500-200 TAB at 23:17

## 2018-03-20 RX ADMIN — Medication 10 ML: at 23:17

## 2018-03-20 RX ADMIN — ACETAMINOPHEN 650 MG: 325 TABLET ORAL at 17:47

## 2018-03-20 RX ADMIN — CALCIUM CARBONATE-VITAMIN D TAB 500 MG-200 UNIT 1 TABLET: 500-200 TAB at 09:36

## 2018-03-20 RX ADMIN — TRAMADOL HYDROCHLORIDE 50 MG: 50 TABLET, FILM COATED ORAL at 16:10

## 2018-03-20 RX ADMIN — TRAMADOL HYDROCHLORIDE 100 MG: 50 TABLET, FILM COATED ORAL at 03:54

## 2018-03-20 RX ADMIN — ACETAMINOPHEN 650 MG: 325 TABLET ORAL at 07:29

## 2018-03-20 RX ADMIN — TRAMADOL HYDROCHLORIDE 100 MG: 50 TABLET, FILM COATED ORAL at 10:07

## 2018-03-20 NOTE — PROGRESS NOTES
Problem: Falls - Risk of  Goal: *Absence of Falls  Document Wali Fall Risk and appropriate interventions in the flowsheet.    Outcome: Progressing Towards Goal  Fall Risk Interventions:  Mobility Interventions: Patient to call before getting OOB    Mentation Interventions: Door open when patient unattended    Medication Interventions: Patient to call before getting OOB    Elimination Interventions: Call light in reach, Patient to call for help with toileting needs

## 2018-03-20 NOTE — PROGRESS NOTES
Patient was visited by [facility]. Volunteer conducted a Spiritual Care Screening and reported no needs to this . Spiritual Care literature was provided. Chaplains will continue to follow and will provide pastoral care as needed or requested. [address], M.Div.   Board Certified   [phone] - Office

## 2018-03-20 NOTE — PROGRESS NOTES
Problem: Mobility Impaired (Adult and Pediatric)  Goal: *Acute Goals and Plan of Care (Insert Text)  In 1-7 days pt will be able to perform:  ST.  Bed mobility:  Rolling L to R to L modified independent for positioning. 2.  Supine to sit to supine S with HR for meals. 3.  Sit to stand to sit S with RW in prep for ambulation. LT.  Gait:  Ambulate >150ft S with RW, WBAT, for home/community mobility. 2.  Stair Negotiation:  Ascend/descend >3 steps CGA with HR for home entry. 3.  Activity tolerance: Tolerate up in chair 1-2 hours for ADLs. 4.  Patient/Family Education:  Patient/family to be independent with HEP for follow-up care and safe discharge. Outcome: Progressing Towards Goal  physical Therapy TREATMENT    Patient: Deena Patten (89 y.o. male)  Date: 3/20/2018  Diagnosis: CHONDROMALACIA, PATELLA, CAD  DJD (degenerative joint disease) of knee <principal problem not specified>  Procedure(s) (LRB):  BILATERAL PATELLA FEMORAL JOINT ARTHROPLASTY **SPEC POP** (Bilateral) 1 Day Post-Op  Precautions: Fall, WBAT   Chart, physical therapy assessment, plan of care and goals were reviewed. ASSESSMENT:  Pt making progress toward PT goals at this time. Pt continues to require Dania for sit-stand transfer with RW/WB use with moderate verbal cuing for correct UE placement on stable surface. During gait training pt ambulated a total of 80 feet with RW/GB use with an antalgic/step-to gait pattern with initial minor buckling of B LEs which pt was able to correct with UE support on RW. Left pt sitting up in a chair with all needs met, call bell within reach, wife in the room and ice packs to B knees. Recommend HHPT vs. Rehab at time of discharge.   Progression toward goals:  []      Improving appropriately and progressing toward goals  [x]      Improving slowly and progressing toward goals  []      Not making progress toward goals and plan of care will be adjusted     PLAN:  Patient continues to benefit from skilled intervention to address the above impairments. Continue treatment per established plan of care. Discharge Recommendations:  Rehab vs. Home Health  Further Equipment Recommendations for Discharge:  rolling walker     SUBJECTIVE:   Patient stated I am still not feeling that well but I can try.     OBJECTIVE DATA SUMMARY:   Critical Behavior:  Neurologic State: Alert, Appropriate for age  Orientation Level: Appropriate for age, Oriented X4  Cognition: Appropriate decision making, Appropriate for age attention/concentration, Appropriate safety awareness, Follows commands  Safety/Judgement: Awareness of environment, Fall prevention, Good awareness of safety precautions, Insight into deficits  Functional Mobility Training:  Bed Mobility:   Supine to Sit: Contact guard assistance; Additional time  Sit to Supine: Minimum assistance; Additional time  Scooting: Minimum assistance; Additional time   Transfers:  Sit to Stand: Minimum assistance; Additional time  Stand to Sit: Minimum assistance; Additional time   Balance:  Sitting: Intact  Sitting - Static: Good (unsupported)  Sitting - Dynamic: Good (unsupported)  Standing: Impaired; With support  Standing - Static: Fair  Standing - Dynamic : Fair  Ambulation/Gait Training:  Distance (ft): 80 Feet (ft)  Assistive Device: Walker, rolling;Gait belt  Ambulation - Level of Assistance: Minimal assistance   Gait Abnormalities: Decreased step clearance  Right Side Weight Bearing: As tolerated  Left Side Weight Bearing: As tolerated  Base of Support: Widened  Stance: Weight shift;Time  Speed/Ju: Slow;Shuffled  Step Length: Right shortened;Left shortened  Swing Pattern: Left asymmetrical;Right asymmetrical   Interventions: Visual/Demos; Verbal cues; Tactile cues; Safety awareness training   Therapeutic Exercises:   HEP included ankle pumps, heel raises, LAQ (AAROM), knee flexion, quad sets, heel slides, seated marching x 10 reps  Pain:  Pain Scale 1: Numeric (0 - 10)  Pain Intensity 1: 8  Pain Location 1: Knee  Pain Orientation 1: Right;Left  Pain Description 1: Aching  Pain Intervention(s) 1: Cold pack  Activity Tolerance:   Fair+  Please refer to the flowsheet for vital signs taken during this treatment.   After treatment:   [x] Patient left in no apparent distress sitting up in chair  [] Patient left in no apparent distress in bed  [x] Call bell left within reach  [x] Nursing notified  [x] Caregiver present  [] Bed alarm activated        Alessandratha Carlos Alberto Ennis PT, DPT     Time Calculation: 40 mins

## 2018-03-20 NOTE — PROGRESS NOTES
POD1  Alert  VSS  Hgb 7.1 from 9+ pre-op  Incisions healing well, dressings dry  Imp anemia secondary fluids; min blood loss  Plan : Recheck H/H, BMP in am  Decrease IV rate, PT

## 2018-03-20 NOTE — ROUTINE PROCESS
1948  Bedside and Verbal shift change report given to Hema Card RN (oncoming nurse) by Erin Vasquez RN (offgoing nurse). Report included the following information SBAR, Kardex and MAR.

## 2018-03-20 NOTE — PROGRESS NOTES
Chart reviewed, met with pt and wife in room. Pt plans discharge home with wife available to assist. [address] offered, pt chose [facility] for follow up; however they were unable to assist. [facility] unable to assist with PT until Monday 3/26. Referral placed with [facility] [phone] for follow up; they are able to see pt upon discharge. Pt agreeable to [facility], has RW for home. CM following for additional needs. Care Management Interventions  PCP Verified by CM:  Yes  Transition of Care Consult (CM Consult): [address]: No  Reason Outside [place]: Unable to staff case ([facility])  Discharge Durable Medical Equipment: No  Physical Therapy Consult: Yes  Occupational Therapy Consult: Yes  Current Support Network: Lives with Spouse, Own Home  Confirm Follow Up Transport: Family  Plan discussed with Pt/Family/Caregiver: Yes  Freedom of Choice Offered: Yes  Discharge Location  Discharge Placement: Home with home health

## 2018-03-20 NOTE — PROGRESS NOTES
1950: Assessment completed and documented. Patient alert and oriented x 4, incision to bilateral knees. ABD pads and bryson stockings dressing clean, dry and intact. Pain 5/10 to bilateral knees on a 0-10/10 numeric scale. Ice packs to site. Patient denies numbness or tingling to bilateral lower and upper extremities. No nausea, no SOB, no distress. Patient educated on IS, and \"up for dinner program\", patient verbalized understanding. 2100: Patient requesting Tylenol to help with pain, rated pain 10/10. Tylenol given. Patient informed about Dilaudid IV available, educated on side effects. Patient decided to wait for Tramadol. 2206: patient rated pain 10/10, given Tramadol for pain per [facility]     2300: Patient resting quietly in bed, no distress. 0015: patient rating pain 8/10, declined ice packs, stated ice made knees hurt more. Dilaudid IV offerred. Patient stated does not want to take Dilaudid and would like Tylenol when available. Patient stated he does not do well with narcotics. 0103: patient stated pain 6/10 to bilateral knees, Tylenol given per request.     8930: patient resting quietly in bed, no distress. 0400: patient medicated for pain 6/10 per [facility]     1615: patient assisted out of bed to chair, patient denies any lightheaded, no SOB. Patient stated feeling weak. Noted H/H 7.1/23. 2. Patient encouraged to call if he wants to go back to bed.

## 2018-03-20 NOTE — PROGRESS NOTES
Problem: Falls - Risk of  Goal: *Absence of Falls  Document Wali Fall Risk and appropriate interventions in the flowsheet.    Outcome: Progressing Towards Goal  Fall Risk Interventions:  Mobility Interventions: Assess mobility with egress test, OT consult for ADLs, PT Consult for assist device competence, Utilize walker, cane, or other assitive device, PT Consult for mobility concerns    Mentation Interventions: Adequate sleep, hydration, pain control, Toileting rounds, Update white board    Medication Interventions: Patient to call before getting OOB, Teach patient to arise slowly    Elimination Interventions: Patient to call for help with toileting needs, Call light in reach, Toileting schedule/hourly rounds

## 2018-03-20 NOTE — ROUTINE PROCESS
Bedside and Verbal shift change report given to Lucia Silva RN (oncoming nurse) by Laura Caputo RN (offgoing nurse). Report included the following information SBAR, Kardex and MAR.

## 2018-03-20 NOTE — PROGRESS NOTES
Pharmacist Renal Dosing Progress Note for Enoxaparin  Physician Dr. Giron Just    The following medication: Enoxaparin was automatically dose-adjusted per THE [facility] P&T Committee Protocol, with respect to renal function. Consult provided for this   80 y.o. , male , for the indication of DVT prophylaxis  Dose adjusted to:  Enoxaparin 30 mg SQ q24h      Pt Weight:   Wt Readings from Last 1 Encounters:   03/19/18 71.7 kg (158 lb)     Previous Regimen   Enoxaparin 40 mg SQ q24h   Serum Creatinine Lab Results   Component Value Date/Time    Creatinine 2.16 (H) 03/20/2018 04:00 AM       Creatinine Clearance Estimated Creatinine Clearance: 25.5 mL/min (based on Cr of 2.16). BUN Lab Results   Component Value Date/Time    BUN 36 (H) 03/20/2018 04:00 AM         Pharmacy to continue to monitor patient daily. Will make dosage adjustments based upon changing renal function.   Signed Tena Barahona information:   [id number]

## 2018-03-20 NOTE — CONSULTS
Medicine Consult    Patient:  Sydnee Gillespie 80 y.o. male  Asked to evaluate patient by Dr. Elli Ortiz  Primary Care Provider:  Mehran Rivers MD  Date of Admission:  3/19/2018  Reason for Consult: COPD/Asthma, other        Assessment/Plan     Patient Active Problem List   Diagnosis Code    DJD (degenerative joint disease) of knee M17.10    CAD (coronary artery disease) I25.10    Chronic kidney disease N18.9    S/P lung transplant ([facility] [address] Z94.2       PLAN:      -Monitor hemodynamics and hemoglobin in and postoperative. Monitor for  postoperative anemia. -CKD : Monitor kidney function. Avoid nephrotoxins. Gentle hydration.  -Continue CAD medications. Monitor for hypotension. If that is  to present, we may need to hold some or all of her blood pressure  medications. -s/p liver transplant : continue with prograf and prednisone.  -Continue prophylactic bactrim and valganciclovir.  -Routine postoperative management, pain control, and deep venous  thrombosis per admitting team.    Thank you for allowing us to participate in this patients care. HPI:   CC:  Sydnee Gillespie is a 80 y.o. male with past medical history significant for CAD, lung transplant, CKD admitted by orthopedic service for bilateral patella femoral joint arthroplasty. He has history of CAD, he had stent placed in the past, was on plavix now only takes baby aspirin. History of lung transplant in 2011. He denies any history of COPD/asthma. He has history of pulmonary fibrosis. He has CKd and is followed by Dr. Loli Pritchard. Baseline creatine around 2. He denies any chest pain, SOB/n/v. Past Medical History:   Diagnosis Date    CAD (coronary artery disease) 2008    blockage    Chronic kidney disease 2017/2018    borderline kidney failure, chronic    Ill-defined condition 2012    benign tremors    Ill-defined condition 1992    idiopathic fibrosing alveolitis    Thromboembolus ([facility] [address] 2012    ?  right leg hx, not officially diagnosed but was treated as if it was a clot     Past Surgical History:   Procedure Laterality Date    ABDOMEN SURGERY PROC UNLISTED  over the years    multiple hernias    CARDIAC SURG PROCEDURE UNLIST  2008    stent placed    CHEST SURGERY PROCEDURE UNLISTED  2011    left lung transplant. Old lung on right side still present    HX TONSILLECTOMY      as a child    HX TRANSPLANT      left lung    AL COLONOSCOPY W/BIOPSY SINGLE/MULTIPLE  7/29/2013           Social History   Substance Use Topics    Smoking status: Never Smoker    Smokeless tobacco: Never Used    Alcohol use No     History reviewed. No pertinent family history. No current facility-administered medications on file prior to encounter. Current Outpatient Prescriptions on File Prior to Encounter   Medication Sig Dispense Refill    tacrolimus (PROGRAF) 1 mg capsule Take  by mouth two (2) times a day. Indications: 1mg in am, 1.5 mg in pm      predniSONE (DELTASONE) 5 mg tablet Take  by mouth daily. 3 tabs every day      propranolol (INDERAL) 10 mg tablet Take 20 mg by mouth two (2) times a day. 10 mg q am and 20 mg pm       atorvastatin (LIPITOR) 20 mg tablet Take 20 mg by mouth daily.  valGANciclovir (VALCYTE) 450 mg tablet Take 450 mg by mouth daily. Indications: takes on sunday and thursday      magnesium oxide (MAG-OX) 400 mg tablet Take 400 mg by mouth daily.  multivitamin (ONE A DAY) tablet Take 1 Tab by mouth daily.  pantoprazole (PROTONIX) 40 mg tablet Take 40 mg by mouth daily.  calcium-cholecalciferol, D3, (CALTRATE 600+D) tablet Take 1 Tab by mouth two (2) times a day.  aspirin 81 mg tablet Take 81 mg by mouth.  SULFAMETHOXAZOLE/TRIMETHOPRIM (SEPTRA PO) Take  by mouth daily.  Indications: only on Monday and Friday        Allergies   Allergen Reactions    Adhesive Other (comments)     blisters    Cefdinir Other (comments)     Felt bad    Levaquin [Levofloxacin] Other (comments)     Fatigue, could not tolerate    Nsaids (Non-Steroidal Anti-Inflammatory Drug) Other (comments)     Due to lung transplant    Other Medication Other (comments)     Posaconsole. Can not remember reaction    Primidone Other (comments)     Feel bad           Review of Systems  Constitutional: No fever, chills, diaphoresis, malaise, fatigue or weight gain/loss or falls  Skin: no itching or rashes  HEENT: no ear discomfort, hearing loss, tinnitus, epistaxis or sore throat  EYES: no blurry vision, double vision or photophobia  CARDIOVASCULAR: no claudication, cp, palpitations, orthopnea, pnd or LE edema  PULMONARY: no cough, wheeze, shortness of breath or sputum production  GI: no nausea, vomiting, diarrhea, abdominal pain, melena, hematemesis or brbpr  : no dysuria, hematuria  MUSCULOSKELETAL: see above  ENDOCRINE: no heat/cold intolerance, polyuria or polydipsia  HEME: no easy bruising or bleeding  NEURO: no unilateral weakness, numbness, tingling or seizures      Physical Exam:      Visit Vitals    /80 (BP 1 Location: Right arm, BP Patient Position: At rest)    Pulse 78    Temp 97.7 °F (36.5 °C)    Resp 16    Ht 5' 9\" (1.753 m)    Wt 71.7 kg (158 lb)    SpO2 96%    BMI 23.33 kg/m2     Body mass index is 23.33 kg/(m^2).     Physical Exam:  GEN: well nourished, laying in bed in no acute distress  HEENT: atraumatic, nose normal,oropharynx clear, MMM  NECK: supple, trachea midline, no supraclavicular or submandibular adenopathy noted  EYES: conjuctiva normal, lids with out lesions, PERRL  HEART: RRR with out m/r/g, pmi nondisplaced, pulses 2+ distally  LUNGS: equal chest wall expansion, cta bl with out wheezes/rales or rhonchi  AB: soft, +BS, nt/nd no organomegaly  NEURO: alert, awake and oriented x3, gait not assessed, cranial nerves intact, strength 5/5 bl UE and LE, sensation intact, reflexes nonpathological  SKIN: dry, intact, warm no breakdown noted        Laboratory Studies:    Recent Results (from the past 24 hour(s))   PROTHROMBIN TIME + INR    Collection Time: 03/19/18 10:00 AM   Result Value Ref Range    Prothrombin time 13.6 11.5 - 15.2 sec    INR 1.1 0.8 - 1.2     PTT    Collection Time: 03/19/18 10:00 AM   Result Value Ref Range    aPTT 26.4 23.0 - 36.4 SEC   CBC WITH AUTOMATED DIFF    Collection Time: 03/19/18 10:00 AM   Result Value Ref Range    WBC 8.8 4.6 - 13.2 K/uL    RBC 3.50 (L) 4.70 - 5.50 M/uL    HGB 9.2 (L) 13.0 - 16.0 g/dL    HCT 30.4 (L) 36.0 - 48.0 %    MCV 86.9 74.0 - 97.0 FL    MCH 26.3 24.0 - 34.0 PG    MCHC 30.3 (L) 31.0 - 37.0 g/dL    RDW 17.6 (H) 11.6 - 14.5 %    PLATELET 200 970 - 772 K/uL    MPV 11.0 9.2 - 11.8 FL    NEUTROPHILS 77 (H) 40 - 73 %    LYMPHOCYTES 11 (L) 21 - 52 %    MONOCYTES 10 3 - 10 %    EOSINOPHILS 1 0 - 5 %    BASOPHILS 1 0 - 2 %    ABS. NEUTROPHILS 6.9 1.8 - 8.0 K/UL    ABS. LYMPHOCYTES 0.9 0.9 - 3.6 K/UL    ABS. MONOCYTES 0.9 0.05 - 1.2 K/UL    ABS. EOSINOPHILS 0.1 0.0 - 0.4 K/UL    ABS.  BASOPHILS 0.0 0.0 - 0.06 K/UL    DF AUTOMATED     TYPE & SCREEN    Collection Time: 03/19/18 10:00 AM   Result Value Ref Range    Crossmatch Expiration 03/22/2018     ABO/Rh(D) O POSITIVE     Antibody screen NEG

## 2018-03-20 NOTE — PROGRESS NOTES
Problem: Mobility Impaired (Adult and Pediatric)  Goal: *Acute Goals and Plan of Care (Insert Text)  In 1-7 days pt will be able to perform:  ST.  Bed mobility:  Rolling L to R to L modified independent for positioning. 2.  Supine to sit to supine S with HR for meals. 3.  Sit to stand to sit S with RW in prep for ambulation. LT.  Gait:  Ambulate >150ft S with RW, WBAT, for home/community mobility. 2.  Stair Negotiation:  Ascend/descend >3 steps CGA with HR for home entry. 3.  Activity tolerance: Tolerate up in chair 1-2 hours for ADLs. 4.  Patient/Family Education:  Patient/family to be independent with HEP for follow-up care and safe discharge. physical Therapy TREATMENT    Patient: Iron Zafar (89 y.o. male)  Date: 3/20/2018  Diagnosis: CHONDROMALACIA, PATELLA, CAD  DJD (degenerative joint disease) of knee <principal problem not specified>  Procedure(s) (LRB):  BILATERAL PATELLA FEMORAL JOINT ARTHROPLASTY **SPEC POP** (Bilateral) 1 Day Post-Op  Precautions: Fall, WBAT   Chart, physical therapy assessment, plan of care and goals were reviewed. ASSESSMENT:  Pt sitting up in a chair upon entry into room with c/o B knee pain and generalized weakness. Pt required AAROM for LAQ and seated marches due to increasing pain and hip flexor/quad weakness. Pt required Dania for sit-stand/stand-sit transfer with increased verbal cuing for correct posture and use of UEs on stable surface. Once standing pt had increased \"shakiness\" and stated he began feeling even more weak thus pt returned to supine position due to safety concerns. Left pt in bed with all needs met, call bell within reach, ice packs to B knee and SCDs applied. Informed Kamille Feliz RN about pt's limited progress with PT. Recommend HHPT at time of discharge.   Progression toward goals:  []      Improving appropriately and progressing toward goals  [x]      Improving slowly and progressing toward goals  []      Not making progress toward goals and plan of care will be adjusted     PLAN:  Patient continues to benefit from skilled intervention to address the above impairments. Continue treatment per established plan of care. Discharge Recommendations:  Home Health  Further Equipment Recommendations for Discharge:  rolling walker     SUBJECTIVE:   Patient stated I just feel so weak and I only have 1 lung from a transplant.     OBJECTIVE DATA SUMMARY:   Critical Behavior:  Neurologic State: Alert, Appropriate for age  Orientation Level: Oriented X4, Appropriate for age  Cognition: Appropriate decision making, Appropriate safety awareness, Appropriate for age attention/concentration, Follows commands  Safety/Judgement: Awareness of environment, Fall prevention, Good awareness of safety precautions, Insight into deficits  Functional Mobility Training:  Bed Mobility:   Sit to Supine: Minimum assistance; Additional time  Scooting: Minimum assistance; Additional time   Transfers:  Sit to Stand: Minimum assistance  Stand to Sit: Minimum assistance   Balance:  Sitting: Intact  Sitting - Static: Good (unsupported)  Sitting - Dynamic: Good (unsupported)  Standing: Impaired; With support  Standing - Static: Fair  Standing - Dynamic : Fair  Ambulation/Gait Training:  Distance (ft): 4 Feet (ft)  Assistive Device: Walker, rolling;Gait belt  Ambulation - Level of Assistance: Minimal assistance   Gait Abnormalities: Antalgic;Decreased step clearance;Shuffling gait; Step to gait  Right Side Weight Bearing: As tolerated  Left Side Weight Bearing: As tolerated  Base of Support: Widened  Stance: Weight shift;Time  Speed/Ju: Slow;Shuffled  Step Length: Right shortened;Left shortened  Swing Pattern: Right asymmetrical;Left asymmetrical   Interventions: Visual/Demos; Verbal cues; Tactile cues; Safety awareness training   Therapeutic Exercises:   HEP included ankle pumps, LAQ (AAROM), knee flexion, heel slides, quad sets, seated marching x 10 reps  Pain:  Pain Scale 1: Numeric (0 - 10)  Pain Intensity 1: 2  Pain Location 1: Knee  Pain Orientation 1: Left;Right  Pain Description 1: Aching  Pain Intervention(s) 1: Cold pack  Activity Tolerance:   Fair  Please refer to the flowsheet for vital signs taken during this treatment.   After treatment:   [] Patient left in no apparent distress sitting up in chair  [x] Patient left in no apparent distress in bed  [x] Call bell left within reach  [x] Nursing notified  [] Caregiver present  [] Bed alarm activated      Jennifer Ennis PT, DPT     Time Calculation: 26 mins

## 2018-03-20 NOTE — PROGRESS NOTES
Problem: Self Care Deficits Care Plan (Adult)  Goal: *Acute Goals and Plan of Care (Insert Text)  Initial Occupational Therapy Goals (3/20/2018) Within 7 day(s):    1. Patient will perform grooming standing sinkside with set up//S for increased independence in ADLs. 2. Patient will perform UB dressing with set up seated EOB for increased independence with ADLs. 3. Patient will perform LB dressing with Min A & A/E PRN for increased independence with ADLs. 4. Patient will perform all aspects of toileting with S for increased independence with ADLs. 5. Patient will perform LB ADLs utilizing body mechanics & adaptive strategies with 1 verbal cue for increased safety in ADLs. 6. Patient will independently apply energy conservation techniques for increased independence with ADLs. Outcome: Progressing Towards Goal  Occupational Therapy EVALUATION    Patient: Tisha Lomas (49 y.o. male)  Date: 3/20/2018  Primary Diagnosis: CHONDROMALACIA, PATELLA, CAD  DJD (degenerative joint disease) of knee  Procedure(s) (LRB):  BILATERAL PATELLA FEMORAL JOINT ARTHROPLASTY **SPEC POP** (Bilateral) 1 Day Post-Op   Precautions:   Fall, WBAT    ASSESSMENT :  Based on the objective data described below, the patient presents with decreased independence with LB ADL, decreased independence with functional mobility, decreased activity tolerance and decreased AROM/strength santiago LE following Santiago UKA. Pt received in bed. Wife at bedside. Pt eager to participate with therapy. Pt seen with PT. Pt SBA for supine to sit transfer. Pt functioning at Mod A level overall for completion of LB ADL. Pt able to perform functional transfers at min A level using RW. Pt ambulated in room and hallway using RW with assist.  Recommend ongoing OT and home with [place] therapy at d/c. Cont OT     Patient will benefit from skilled intervention to address the above impairments.   Patients rehabilitation potential is considered to be Good  Factors which may influence rehabilitation potential include:   []             None noted  []             Mental ability/status  []             Medical condition  []             Home/family situation and support systems  []             Safety awareness  [x]             Pain tolerance/management  []             Other:        PLAN :  Recommendations and Planned Interventions:  [x]               Self Care Training                  [x]        Therapeutic Activities  [x]               Functional Mobility Training    []        Cognitive Retraining  [x]               Therapeutic Exercises           [x]        Endurance Activities  []               Balance Training                   []        Neuromuscular Re-Education  []               Visual/Perceptual Training     []   Home Safety Training  [x]               Patient Education                 []        Family Training/Education  []               Other (comment):    Frequency/Duration: Patient will be followed by occupational therapy 2-3 visits to address goals. Discharge Recommendations: Home Health  Further Equipment Recommendations for Discharge: N/A     SUBJECTIVE:   Patient alert and cooperative    OBJECTIVE DATA SUMMARY:     Past Medical History:   Diagnosis Date    CAD (coronary artery disease) 2008    blockage    Chronic kidney disease 2017/2018    borderline kidney failure, chronic    Ill-defined condition 2012    benign tremors    Ill-defined condition 1992    idiopathic fibrosing alveolitis    Thromboembolus ([facility] [address] 2012    ? 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    HX TONSILLECTOMY      as a child    HX TRANSPLANT      left lung    NH COLONOSCOPY W/BIOPSY SINGLE/MULTIPLE  7/29/2013          Barriers to Learning/Limitations: None  Compensate with: visual, verbal, tactile, kinesthetic cues/model  Prior Level of Function/Home Situation:   Home Situation  Home Environment: Private residence  # Steps to Enter: 4  Rails to Enter: Yes  Hand Rails : Bilateral  One/Two Story Residence: Two story, live on 1st floor  # of Interior Steps: 24  Height of Each Step (in): 8 inches  Interior Rails: Both  Lift Chair Available: No  Living Alone: No  Support Systems: Spouse/Significant Other/Partner  Patient Expects to be Discharged to[de-identified] Private residence  Current DME Used/Available at Home: Raised toilet seat, Shower chair, Walker, rolling  Tub or Shower Type: Shower  [x]  Right hand dominant   []  Left hand dominant  Cognitive/Behavioral Status:  Neurologic State: Alert; Appropriate for age  Orientation Level: Appropriate for age;Oriented X4  Cognition: Appropriate decision making; Appropriate for age attention/concentration; Appropriate safety awareness; Follows commands  Safety/Judgement: Awareness of environment; Fall prevention;Good awareness of safety precautions; Insight into deficits  Skin: dressing intact wilder knees  Edema: R knee greater than L  Vision/Perceptual:    Corrective Lenses: Glasses  Coordination:  Coordination: Within functional limits  Balance:  Sitting: Intact  Sitting - Static: Good (unsupported)  Sitting - Dynamic: Good (unsupported)  Standing: Impaired  Standing - Static: Fair  Standing - Dynamic : Fair  Strength:  Strength: Generally decreased, functional   Decreased wilder LE   Tone & Sensation:  Tone: Normal  Sensation: Intact  Range of Motion:  AROM: Generally decreased, functional   Decreased wilder LE  Functional Mobility and Transfers for ADLs:  Bed Mobility:  Supine to Sit: Stand-by assistance  Sit to Supine: Minimum assistance; Additional time  Scooting: Supervision  Transfers:  Sit to Stand: Minimum assistance  ADL Assessment:  Oral Facial Hygiene/Grooming: Setup  Bathing: Moderate assistance  Upper Body Dressing: Setup  Lower Body Dressing: Moderate assistance  Toileting: Minimum assistance  ADL Intervention:  Lower Body Dressing Assistance  Pants With Elastic Waist: Moderate assistance without AE; pt has LH reacher available to assist at home   Position Performed: Seated edge of bed  Pain:  Pre-treatment: 9/10 wilder knees  Post-treatment: 9/10 wilder knees  Activity Tolerance:   fair  Please refer to the flowsheet for vital signs taken during this treatment. After treatment:   [x] Patient left in no apparent distress sitting up in chair  [] Patient left in no apparent distress in bed  [x] Call bell left within reach  [] Nursing notified  [] Caregiver present  [] Bed alarm activated    COMMUNICATION/EDUCATION:    [] Home safety education was provided and the patient/caregiver indicated understanding. [] Patient/family have participated as able in goal setting and plan of care. [x] Patient/family agree to work toward stated goals and plan of care. [] Patient understands intent and goals of therapy, but is neutral about his/her participation. [] Patient is unable to participate in goal setting and plan of care. Thank you for this referral.  Lauren Hernandes OTR/L  Time Calculation: 28 mins    Eval Complexity: History: LOW Complexity : Brief history review ; Examination: LOW Complexity : 1-3 performance deficits relating to physical, cognitive , or psychosocial skils that result in activity limitations and / or participation restrictions ; Decision Making:MEDIUM Complexity : Patient may present with comorbidities that affect occupational performnce.  Miniml to moderate modification of tasks or assistance (eg, physical or verbal ) with assesment(s) is necessary to enable patient to complete evaluation

## 2018-03-20 NOTE — PROGRESS NOTES
0729  Pt is awake, alert, oriented x 4. Sitting on chair and eating breakfast.  Pain level to both knees 3/10. Medicated with Tylenol 650 mg po. Denies back pain at this time. 7:59 AM   Pt was seen by PT. Pt ambulated in the room. Pt became tired and shaky and in pain andcannot do more with PT. Pt was assisted by Pt back in bed. Dr Myrna Franks was made aware of Hgb 7.1. As per MD, Dr Cici Ayala will decide  If needed blood transfusion. 9:46 AM Pt resting in bed. Lungs are clear. Abdomen is obese,soft with active BS. Pain level 2/10.    10:01 AM  RN spoke to Dr Cici Ayala regarding pt's hgb of 7.1 and poor performance with PT because of pain, feeling tired and shaky this morning. Dr Cici Ayala will come see and evaluate pt .   1007  Pt medicated with Ultram 100 mg po for pain level 2/10.    1105  Pt denies pain while at rest.  Wife at bedside. 12:14 PM   Dr Cici Ayala. Decreased IVF to 25 ml/hr. Pt for CBC and Chem 7 tomorrow. 1  Pt ambulated with Pt to nurses station. 4:48 PM   Pt voided 100 ml  This morning and 50 ml this afternoon. Bladder scan with onlt 155 ml. Dr Cici Ayala is aware. No new order at this time. 5:50 PM   Pt sitting at edge of bed. Eating dinner. Tylenol 650 mg po given fpr  Pain level 2/10.    1930   Dressings to both knees were changed. Incision intact with glue. Both knees were slightly bruised and swollen. ABD pad applied to both knees and held in place by TEDS. Ice pack applied to both knees.

## 2018-03-20 NOTE — PROGRESS NOTES
Hospitalist Progress Note    Patient: Liliane Price MRN: [id number]  CSN: [id number]    YOB: 1933  Age: 80 y.o. Sex: male    DOA: 3/19/2018 LOS:  LOS: 1 day                Assessment/Plan     Patient Active Problem List   Diagnosis Code    DJD (degenerative joint disease) of knee M17.10    CAD (coronary artery disease) I25.10    Chronic kidney disease N18.9    S/P lung transplant [facility]) Z94.2            81 yo male admitted for bilateral patella femoral joint arthroplasty. CAD - no chest pain, continue home medications. CKD - creatine stable    History of lung transplant, continue anti rejection medication    Anemia - ABL, blood transfusion per primary. Disposition : per primary    Review of systems  General: No fevers or chills. Cardiovascular: No chest pain or pressure. No palpitations. Pulmonary: No shortness of breath. Gastrointestinal: No nausea, vomiting. Physical Exam:  General: Awake, cooperative, no acute distress    HEENT: NC, Atraumatic. PERRLA, anicteric sclerae. Lungs: CTA Bilaterally. No Wheezing/Rhonchi/Rales. Heart:  Regular  rhythm,  No murmur, No Rubs, No Gallops  Abdomen: Soft, Non distended, Non tender.  +Bowel sounds,   Extremities: No c/c/e  Psych:   Not anxious or agitated. Neurologic:  No acute neurological deficit.            Vital signs/Intake and Output:  Visit Vitals    /45 (BP 1 Location: Right arm, BP Patient Position: Sitting)    Pulse 62    Temp 97.8 °F (36.6 °C)    Resp 18    Ht 5' 9\" (1.753 m)    Wt 71.7 kg (158 lb)    SpO2 99%    BMI 23.33 kg/m2     Current Shift:  03/20 0701 - 03/20 1900  In: 730 [P.O.:730]  Out: 150 [Urine:150]  Last three shifts:  03/18 1901 - 03/20 0700  In: 1998.9 [P.O.:480; I.V.:1518.9]  Out: 1100 [Urine:1075]            Labs: Results:       Chemistry Recent Labs      03/20/18   0400   GLU  143*   NA  139   K  5.1   CL  105   CO2  24   BUN  36*   CREA  2.16*   CA  8.6   AGAP  10   BUCR  17      CBC w/Diff Recent Labs      03/20/18   0400  03/19/18   1000   WBC   --   8.8   RBC   --   3.50*   HGB  7.1*  9.2*   HCT  23.2*  30.4*   PLT   --   191   GRANS   --   77*   LYMPH   --   11*   EOS   --   1      Cardiac Enzymes No results for input(s): CPK, CKND1, FRANKY in the last 72 hours. No lab exists for component: CKRMB, TROIP   Coagulation Recent Labs      03/19/18   1000   PTP  13.6   INR  1.1   APTT  26.4       Lipid Panel No results found for: CHOL, CHOLPOCT, CHOLX, CHLST, CHOLV, 673340, HDL, LDL, LDLC, DLDLP, 753878, VLDLC, VLDL, TGLX, TRIGL, TRIGP, TGLPOCT, CHHD, CHHDX   BNP No results for input(s): BNPP in the last 72 hours. Liver Enzymes No results for input(s): TP, ALB, TBIL, AP, SGOT, GPT in the last 72 hours.     No lab exists for component: DBIL   Thyroid Studies No results found for: T4, T3U, TSH, TSHEXT     Procedures/imaging: see electronic medical records for all procedures/Xrays and details which were not copied into this note but were reviewed prior to creation of Plan

## 2018-03-21 LAB
ANION GAP SERPL CALC-SCNC: 8 MMOL/L (ref 3–18)
BASOPHILS # BLD: 0 K/UL (ref 0–0.06)
BASOPHILS NFR BLD: 0 % (ref 0–2)
BUN SERPL-MCNC: 46 MG/DL (ref 7–18)
BUN/CREAT SERPL: 16 (ref 12–20)
CALCIUM SERPL-MCNC: 8.2 MG/DL (ref 8.5–10.1)
CHLORIDE SERPL-SCNC: 102 MMOL/L (ref 100–108)
CO2 SERPL-SCNC: 25 MMOL/L (ref 21–32)
CREAT SERPL-MCNC: 2.9 MG/DL (ref 0.6–1.3)
DIFFERENTIAL METHOD BLD: ABNORMAL
EOSINOPHIL # BLD: 0.1 K/UL (ref 0–0.4)
EOSINOPHIL NFR BLD: 1 % (ref 0–5)
ERYTHROCYTE [DISTWIDTH] IN BLOOD BY AUTOMATED COUNT: 17.7 % (ref 11.6–14.5)
GLUCOSE SERPL-MCNC: 109 MG/DL (ref 74–99)
HCT VFR BLD AUTO: 22.8 % (ref 36–48)
HGB BLD-MCNC: 7 G/DL (ref 13–16)
LYMPHOCYTES # BLD: 1.2 K/UL (ref 0.9–3.6)
LYMPHOCYTES NFR BLD: 13 % (ref 21–52)
MCH RBC QN AUTO: 26.3 PG (ref 24–34)
MCHC RBC AUTO-ENTMCNC: 30.7 G/DL (ref 31–37)
MCV RBC AUTO: 85.7 FL (ref 74–97)
MONOCYTES # BLD: 1.2 K/UL (ref 0.05–1.2)
MONOCYTES NFR BLD: 12 % (ref 3–10)
NEUTS SEG # BLD: 7.3 K/UL (ref 1.8–8)
NEUTS SEG NFR BLD: 74 % (ref 40–73)
PLATELET # BLD AUTO: 159 K/UL (ref 135–420)
PMV BLD AUTO: 11.3 FL (ref 9.2–11.8)
POTASSIUM SERPL-SCNC: 5 MMOL/L (ref 3.5–5.5)
RBC # BLD AUTO: 2.66 M/UL (ref 4.7–5.5)
SODIUM SERPL-SCNC: 135 MMOL/L (ref 136–145)
WBC # BLD AUTO: 9.8 K/UL (ref 4.6–13.2)

## 2018-03-21 PROCEDURE — 36415 COLL VENOUS BLD VENIPUNCTURE: CPT | Performed by: ORTHOPAEDIC SURGERY

## 2018-03-21 PROCEDURE — 85025 COMPLETE CBC W/AUTO DIFF WBC: CPT | Performed by: ORTHOPAEDIC SURGERY

## 2018-03-21 PROCEDURE — 65270000029 HC RM PRIVATE

## 2018-03-21 PROCEDURE — 74011250636 HC RX REV CODE- 250/636: Performed by: ORTHOPAEDIC SURGERY

## 2018-03-21 PROCEDURE — 80048 BASIC METABOLIC PNL TOTAL CA: CPT | Performed by: ORTHOPAEDIC SURGERY

## 2018-03-21 PROCEDURE — 77030012891

## 2018-03-21 PROCEDURE — 74011250637 HC RX REV CODE- 250/637: Performed by: HOSPITALIST

## 2018-03-21 PROCEDURE — 74011250637 HC RX REV CODE- 250/637: Performed by: ORTHOPAEDIC SURGERY

## 2018-03-21 PROCEDURE — 97110 THERAPEUTIC EXERCISES: CPT

## 2018-03-21 PROCEDURE — 74011636637 HC RX REV CODE- 636/637: Performed by: ORTHOPAEDIC SURGERY

## 2018-03-21 PROCEDURE — 97116 GAIT TRAINING THERAPY: CPT

## 2018-03-21 RX ORDER — POLYETHYLENE GLYCOL 3350 17 G/17G
17 POWDER, FOR SOLUTION ORAL DAILY
Status: DISCONTINUED | OUTPATIENT
Start: 2018-03-21 | End: 2018-03-22 | Stop reason: HOSPADM

## 2018-03-21 RX ORDER — POLYETHYLENE GLYCOL 3350 17 G/17G
17 POWDER, FOR SOLUTION ORAL DAILY
Status: DISCONTINUED | OUTPATIENT
Start: 2018-03-22 | End: 2018-03-22

## 2018-03-21 RX ADMIN — TRAMADOL HYDROCHLORIDE 50 MG: 50 TABLET, FILM COATED ORAL at 11:35

## 2018-03-21 RX ADMIN — PROPRANOLOL HYDROCHLORIDE 20 MG: 20 TABLET ORAL at 21:06

## 2018-03-21 RX ADMIN — POLYETHYLENE GLYCOL 3350 17 G: 17 POWDER, FOR SOLUTION ORAL at 17:11

## 2018-03-21 RX ADMIN — Medication 10 ML: at 05:27

## 2018-03-21 RX ADMIN — MULTIPLE VITAMINS W/ MINERALS TAB 1 TABLET: TAB at 08:30

## 2018-03-21 RX ADMIN — Medication 10 ML: at 21:06

## 2018-03-21 RX ADMIN — CALCIUM CARBONATE-VITAMIN D TAB 500 MG-200 UNIT 1 TABLET: 500-200 TAB at 21:07

## 2018-03-21 RX ADMIN — Medication 10 ML: at 14:23

## 2018-03-21 RX ADMIN — TRAMADOL HYDROCHLORIDE 50 MG: 50 TABLET, FILM COATED ORAL at 18:05

## 2018-03-21 RX ADMIN — TACROLIMUS 1 MG: 1 CAPSULE ORAL at 08:32

## 2018-03-21 RX ADMIN — PREDNISONE 5 MG: 5 TABLET ORAL at 08:31

## 2018-03-21 RX ADMIN — Medication 400 MG: at 08:31

## 2018-03-21 RX ADMIN — ENOXAPARIN SODIUM 30 MG: 30 INJECTION SUBCUTANEOUS at 00:53

## 2018-03-21 RX ADMIN — PROPRANOLOL HYDROCHLORIDE 20 MG: 20 TABLET ORAL at 08:32

## 2018-03-21 RX ADMIN — ATORVASTATIN CALCIUM 20 MG: 20 TABLET, FILM COATED ORAL at 08:31

## 2018-03-21 RX ADMIN — ACETAMINOPHEN 650 MG: 325 TABLET ORAL at 12:27

## 2018-03-21 RX ADMIN — ACETAMINOPHEN 650 MG: 325 TABLET ORAL at 08:30

## 2018-03-21 RX ADMIN — ACETAMINOPHEN 650 MG: 325 TABLET ORAL at 16:34

## 2018-03-21 RX ADMIN — PANTOPRAZOLE SODIUM 40 MG: 40 TABLET, DELAYED RELEASE ORAL at 08:31

## 2018-03-21 RX ADMIN — CALCIUM CARBONATE-VITAMIN D TAB 500 MG-200 UNIT 1 TABLET: 500-200 TAB at 08:30

## 2018-03-21 RX ADMIN — TRAMADOL HYDROCHLORIDE 100 MG: 50 TABLET, FILM COATED ORAL at 05:26

## 2018-03-21 RX ADMIN — ACETAMINOPHEN 650 MG: 325 TABLET ORAL at 21:07

## 2018-03-21 NOTE — PROGRESS NOTES
2997 Assumed care of patient from Heriberto Olivo RN. Shift assessment initiated. Pain voiced at 3/10, denies the need for pain interventions at this time. Ice applied to site. All questions and concerns answered. All needs met. Slight confused noted before shift change, patient states he has never had that issue before. Bed alarm initiated. IS encouraged. Bed in the lowest position. Call bell within reach. No further needs at this time. Will continue to monitor for changes. 0546 CHG wipes complete. Pain voiced at 10/10, medicated per MAR. No further needs at this time. Bedside and Verbal shift change report given to Kirti Keys RN (oncoming nurse) by HO Morales RN (offgoing nurse). Report included the following information SBAR, Kardex, MAR, Recent Results and Med Rec Status.

## 2018-03-21 NOTE — PROGRESS NOTES
Hospitalist Progress Note    Patient: Sydnee Gillespie MRN: [id number]  CSN: [id number]    YOB: 1933  Age: 80 y.o. Sex: male    DOA: 3/19/2018 LOS:  LOS: 2 days                Assessment/Plan     Patient Active Problem List   Diagnosis Code    DJD (degenerative joint disease) of knee M17.10    CAD (coronary artery disease) I25.10    Chronic kidney disease N18.9    S/P lung transplant [facility]) Z94.2            79 yo male admitted for bilateral patella femoral joint arthroplasty. CAD - no chest pain, continue home medications. CKD - creatine stable    History of lung transplant, continue anti rejection medication    Anemia - ABL, blood transfusion per primary. Constipation - started on miralax. If discharged, follow up with PCP  Discussed with patient and wife. Disposition : per primary    Review of systems  General: No fevers or chills. Cardiovascular: No chest pain or pressure. No palpitations. Pulmonary: No shortness of breath. Gastrointestinal: No nausea, vomiting. Physical Exam:  General: Awake, cooperative, no acute distress    HEENT: NC, Atraumatic. PERRLA, anicteric sclerae. Lungs: CTA Bilaterally. No Wheezing/Rhonchi/Rales. Heart:  Regular  rhythm,  No murmur, No Rubs, No Gallops  Abdomen: Soft, Non distended, Non tender.  +Bowel sounds,   Extremities: No c/c/e  Psych:   Not anxious or agitated. Neurologic:  No acute neurological deficit. Vital signs/Intake and Output:  Visit Vitals    /63    Pulse 68    Temp 97.5 °F (36.4 °C)    Resp 16    Ht 5' 9\" (1.753 m)    Wt 71.7 kg (158 lb)    SpO2 97%    BMI 23.33 kg/m2     Current Shift:  03/21 0701 - 03/21 1900  In: 700 [P.O.:700]  Out: -   Last three shifts:  03/19 1901 - 03/21 0700  In: 1928.9 [P.O.:1210;  I.V.:718.9]  Out: 1250 [Urine:1250]            Labs: Results:       Chemistry Recent Labs      03/21/18   0255  03/20/18   0400   GLU  109*  143*   NA  135*  139   K  5.0  5.1   CL  102 105   CO2  25  24   BUN  46*  36*   CREA  2.90*  2.16*   CA  8.2*  8.6   AGAP  8  10   BUCR  16  17      CBC w/Diff Recent Labs      03/21/18   0255  03/20/18   0400  03/19/18   1000   WBC  9.8   --   8.8   RBC  2.66*   --   3.50*   HGB  7.0*  7.1*  9.2*   HCT  22.8*  23.2*  30.4*   PLT  159   --   191   GRANS  74*   --   77*   LYMPH  13*   --   11*   EOS  1   --   1      Cardiac Enzymes No results for input(s): CPK, CKND1, FRANKY in the last 72 hours. No lab exists for component: CKRMB, TROIP   Coagulation Recent Labs      03/19/18   1000   PTP  13.6   INR  1.1   APTT  26.4       Lipid Panel No results found for: CHOL, CHOLPOCT, CHOLX, CHLST, CHOLV, 972372, HDL, LDL, LDLC, DLDLP, 573106, VLDLC, VLDL, TGLX, TRIGL, TRIGP, TGLPOCT, CHHD, CHHDX   BNP No results for input(s): BNPP in the last 72 hours. Liver Enzymes No results for input(s): TP, ALB, TBIL, AP, SGOT, GPT in the last 72 hours.     No lab exists for component: DBIL   Thyroid Studies No results found for: T4, T3U, TSH, TSHEXT, TSHEXT     Procedures/imaging: see electronic medical records for all procedures/Xrays and details which were not copied into this note but were reviewed prior to creation of Plan

## 2018-03-21 NOTE — PROGRESS NOTES
0745: Received report from Linwood TEAGUE RN. Assumed pt care at this time. 7374: Assessment completed. Patient is A&O X4. Patient is calm and cooperative. Pt is up in chair,  Patient PERRLA, oral mucosa pink and moist, strong  on both upper extremities. Lung sound clear not appear distress. Bowel sounds active, but stated that no passing gas today, but yesterday did. Pedal pulses are palpable, no complain of any numbness or tingling. IV site dry and dressing intact. ABD and luis hose dressing to knee bilateral has scant old drainge to the left knee, and clear yellow drainage to the right knee, Ice is applied. SCD are applied. Pain is 7/10.  bed is in lower position, call bell and personal items are within reach. Pain regimen and activities are educated, educated pt to call when getting up to prevent fall. Pt verbalized understanding. 1030: Page Dr Gregory Sibley at this time to notified the H&H level. 1103: Dr Gregory Sibley called back,updated him that pt feels weak and H&H level. Dr Gregory Sibley said we are not going to give him blood at this time. He said pt's VS is stable at this point. He said he would come to see the pt a little while. Pt is currently walking with PT and CNA. 1637: Ambulated pt to use the bedside commode. Pt tolerated fine with 2 person assist. Appears anxious, knee buckle when getting ready to sit on the bed. Voiding no problem. Appears facial grimacing when standing up. Changed ABD dressing at this time, incision site approximate. No drainage notes. New ABD pads and Luis hose applied. Pt tolerated well.

## 2018-03-21 NOTE — PROGRESS NOTES
Problem: Falls - Risk of  Goal: *Absence of Falls  Document Wali Fall Risk and appropriate interventions in the flowsheet.    Outcome: Progressing Towards Goal  Fall Risk Interventions:  Mobility Interventions: Assess mobility with egress test    Mentation Interventions: Adequate sleep, hydration, pain control    Medication Interventions: Patient to call before getting OOB    Elimination Interventions: Patient to call for help with toileting needs

## 2018-03-21 NOTE — ROUTINE PROCESS
1940  Bedside and Verbal shift change report given to Garrick Collazo RN (oncoming nurse) by Miesha Lopez RN (offgoing nurse). Report included the following information SBAR, Kardex and MAR.

## 2018-03-21 NOTE — PROGRESS NOTES
POD2  Alert  VSS  Hasn't cleared PT but progressing  Dressings dry; N/V intact  Hgb 7 stable  Imp: progressing  Plan: PT, home tomorrow

## 2018-03-21 NOTE — PROGRESS NOTES
Problem: Mobility Impaired (Adult and Pediatric)  Goal: *Acute Goals and Plan of Care (Insert Text)  In 1-7 days pt will be able to perform:  ST.  Bed mobility:  Rolling L to R to L modified independent for positioning. 2.  Supine to sit to supine S with HR for meals. 3.  Sit to stand to sit S with RW in prep for ambulation. LT.  Gait:  Ambulate >150ft S with RW, WBAT, for home/community mobility. 2.  Stair Negotiation:  Ascend/descend >3 steps CGA with HR for home entry. 3.  Activity tolerance: Tolerate up in chair 1-2 hours for ADLs. 4.  Patient/Family Education:  Patient/family to be independent with HEP for follow-up care and safe discharge. physical Therapy TREATMENT    Patient: Deena Patten (89 y.o. male)  Date: 3/21/2018  Diagnosis: CHONDROMALACIA, PATELLA, CAD  DJD (degenerative joint disease) of knee <principal problem not specified>  Procedure(s) (LRB):  BILATERAL PATELLA FEMORAL JOINT ARTHROPLASTY **SPEC POP** (Bilateral) 2 Days Post-Op  Precautions: Fall, WBAT   Chart, physical therapy assessment, plan of care and goals were reviewed. ASSESSMENT:  Pt making slow progress toward PT goals at this time but continues to be motivated to participate with PT. Pt required Dania for sit-stand transfer with RW/GB use with increased time required. During gait training pt required [address] with RW/GB use but with 2 instances of L knee buckling requiring ModA to regain NAVIN and balance. Left pt sitting up in a chair with all needs met, call bell within reach and wife in the room. Recommend Rehab vs. HHPT at time of discharge. Pt may require medical transport into home as pt's wife insistent that pt return home.     Progression toward goals:  []      Improving appropriately and progressing toward goals  [x]      Improving slowly and progressing toward goals  []      Not making progress toward goals and plan of care will be adjusted     PLAN:  Patient continues to benefit from skilled intervention to address the above impairments. Continue treatment per established plan of care. Discharge Recommendations:  Rehab vs Home Health  Further Equipment Recommendations for Discharge:  rolling walker     SUBJECTIVE:   Patient stated I just feel so weak still.     OBJECTIVE DATA SUMMARY:   Critical Behavior:  Neurologic State: Alert, Appropriate for age  Orientation Level: Appropriate for age, Oriented X4  Cognition: Appropriate decision making, Appropriate for age attention/concentration, Appropriate safety awareness, Follows commands  Safety/Judgement: Awareness of environment, Fall prevention, Good awareness of safety precautions, Insight into deficits  Functional Mobility Training:  Bed Mobility:  Rolling: Supervision  Supine to Sit: Minimum assistance  Transfers:  Sit to Stand: Minimum assistance; Additional time  Stand to Sit: Minimum assistance; Additional time   Balance:  Sitting: Impaired  Sitting - Static: Good (unsupported)  Sitting - Dynamic: Good (unsupported)  Standing: Impaired; With support  Standing - Static: Fair  Standing - Dynamic : Poor  Ambulation/Gait Training:  Distance (ft): 120 Feet (ft)  Assistive Device: Walker, rolling;Gait belt  Ambulation - Level of Assistance: Moderate assistance   Gait Abnormalities: Antalgic;Decreased step clearance  Right Side Weight Bearing: As tolerated  Left Side Weight Bearing: As tolerated  Base of Support: Narrowed  Stance: Weight shift  Speed/Ju: Slow;Shuffled  Step Length: Right shortened;Left shortened  Swing Pattern: Left asymmetrical;Right asymmetrical   Interventions: Visual/Demos; Verbal cues; Tactile cues; Safety awareness training  Therapeutic Exercises:   HEP included ankle pumps, LAQ (AAROM on the left), seated marching, heel slides and quad sets x 10 reps  Pain:  Pain Scale 1: Numeric (0 - 10)  Pain Intensity 1: 4  Pain Location 1: Knee  Pain Orientation 1: Left;Right  Pain Description 1: Constant; Sharp  Pain Intervention(s) 1: Medication (see MAR)  Activity Tolerance:   Fair  Please refer to the flowsheet for vital signs taken during this treatment.   After treatment:   [x] Patient left in no apparent distress sitting up in chair  [] Patient left in no apparent distress in bed  [x] Call bell left within reach  [x] Nursing notified  [x] Caregiver present  [] Bed alarm activated        Cleveland Ennis PT, DPT     Time Calculation: 25 mins

## 2018-03-21 NOTE — PROGRESS NOTES
Problem: Mobility Impaired (Adult and Pediatric)  Goal: *Acute Goals and Plan of Care (Insert Text)  In 1-7 days pt will be able to perform:  ST.  Bed mobility:  Rolling L to R to L modified independent for positioning. 2.  Supine to sit to supine S with HR for meals. 3.  Sit to stand to sit S with RW in prep for ambulation. LT.  Gait:  Ambulate >150ft S with RW, WBAT, for home/community mobility. 2.  Stair Negotiation:  Ascend/descend >3 steps CGA with HR for home entry. 3.  Activity tolerance: Tolerate up in chair 1-2 hours for ADLs. 4.  Patient/Family Education:  Patient/family to be independent with HEP for follow-up care and safe discharge. physical Therapy TREATMENT    Patient: Tisha Lomas (42 y.o. male)  Date: 3/21/2018  Diagnosis: CHONDROMALACIA, PATELLA, CAD  DJD (degenerative joint disease) of knee <principal problem not specified>  Procedure(s) (LRB):  BILATERAL PATELLA FEMORAL JOINT ARTHROPLASTY **SPEC POP** (Bilateral) 2 Days Post-Op  Precautions: Fall, WBAT   Chart, physical therapy assessment, plan of care and goals were reviewed. ASSESSMENT:  Pt making very slow progress toward PT goals at this time with c/o increased pain 8/10 pre-PT and 10/10 post PT. Pt required increased assistance for bed mobility and functional transfers today as compared to yesterday. Left pt sitting up in a chair for breakfast as requested with all needs met, call bell within reach, Corbin Lord RN in the room and ice packs to B knees. Progression toward goals:  []      Improving appropriately and progressing toward goals  [x]      Improving slowly and progressing toward goals  []      Not making progress toward goals and plan of care will be adjusted     PLAN:  Patient continues to benefit from skilled intervention to address the above impairments. Continue treatment per established plan of care.   Discharge Recommendations:  Rehab vs. Home Health  Further Equipment Recommendations for Discharge: rolling walker     SUBJECTIVE:   Patient stated I am feeling worse today, I feel so weak.     OBJECTIVE DATA SUMMARY:   Critical Behavior:  Neurologic State: Alert, Appropriate for age  Orientation Level: Oriented X4, Appropriate for age  Cognition: Appropriate decision making, Appropriate for age attention/concentration, Appropriate safety awareness, Follows commands  Safety/Judgement: Awareness of environment, Fall prevention, Good awareness of safety precautions, Insight into deficits  Functional Mobility Training:  Bed Mobility:  Rolling: Supervision  Supine to Sit: Minimum assistance   Transfers:  Sit to Stand: Moderate assistance; Additional time  Stand to Sit: Minimum assistance; Additional time  Balance:  Sitting: Intact  Sitting - Static: Good (unsupported)  Sitting - Dynamic: Good (unsupported)  Standing: Impaired; With support  Standing - Static: Fair  Standing - Dynamic : Fair  Ambulation/Gait Training:  Distance (ft): 15 Feet (ft)  Assistive Device: Walker, rolling;Gait belt  Ambulation - Level of Assistance: Minimal assistance   Gait Abnormalities: Antalgic;Decreased step clearance;Shuffling gait  Right Side Weight Bearing: As tolerated  Left Side Weight Bearing: As tolerated  Base of Support: Widened  Stance: Weight shift  Speed/Ju: Slow;Shuffled  Step Length: Right shortened;Left shortened  Swing Pattern: Right asymmetrical;Left asymmetrical   Interventions: Visual/Demos; Tactile cues; Verbal cues; Safety awareness training   Therapeutic Exercises:   HEP included ankle pumps, heel slides, quad sets, knee flexion x 5 reps  Pain:  Pain Scale 1: Numeric (0 - 10)  Pain Intensity 1: 8  Pain Location 1: Knee  Pain Orientation 1: Left;Right  Pain Description 1: Constant; Sharp  Pain Intervention(s) 1: Ice  Activity Tolerance:   Fair-  Please refer to the flowsheet for vital signs taken during this treatment.   After treatment:   [x] Patient left in no apparent distress sitting up in chair  [] Patient left in no apparent distress in bed  [x] Call bell left within reach  [x] Nursing notified  [] Caregiver present  [] Bed alarm activated        Michell Ennis PT, DPT     Time Calculation: 28 mins

## 2018-03-21 NOTE — PROGRESS NOTES
Bedside and Verbal shift change report given to Eva ELIZABETH RN (oncoming nurse) by Domonique Tapia. Chelle Weiss (offgoing nurse).  Report included the following information SBAR, Kardex, Intake/Output, MAR and Alarm Parameters .

## 2018-03-21 NOTE — PROGRESS NOTES
Spoke with pt and wife in room after PT session this morning about possible SNF placement. Pt and wife both decline SNF, wife and pt state pt has been to SNF before and both felt pt did better once home than when in rehab. Wife states that once pt home, he will be \"up because I (wife) will make sure he moves\". Pt agrees. [facility]  [id number] has been arranged for follow up, pt has DME for home.

## 2018-03-22 VITALS
OXYGEN SATURATION: 100 % | WEIGHT: 158 LBS | SYSTOLIC BLOOD PRESSURE: 111 MMHG | RESPIRATION RATE: 15 BRPM | HEIGHT: 69 IN | HEART RATE: 65 BPM | DIASTOLIC BLOOD PRESSURE: 53 MMHG | BODY MASS INDEX: 23.4 KG/M2 | TEMPERATURE: 97.7 F

## 2018-03-22 PROBLEM — Z94.2 S/P LUNG TRANSPLANT (HCC): Status: RESOLVED | Noted: 2018-03-19 | Resolved: 2018-03-22

## 2018-03-22 PROBLEM — M17.9 DJD (DEGENERATIVE JOINT DISEASE) OF KNEE: Status: RESOLVED | Noted: 2018-03-19 | Resolved: 2018-03-22

## 2018-03-22 LAB
ERYTHROCYTE [DISTWIDTH] IN BLOOD BY AUTOMATED COUNT: 17.7 % (ref 11.6–14.5)
HCT VFR BLD AUTO: 21.7 % (ref 36–48)
HCT VFR BLD AUTO: 23.3 % (ref 36–48)
HGB BLD-MCNC: 6.6 G/DL (ref 13–16)
HGB BLD-MCNC: 7.2 G/DL (ref 13–16)
MCH RBC QN AUTO: 26 PG (ref 24–34)
MCHC RBC AUTO-ENTMCNC: 30.4 G/DL (ref 31–37)
MCV RBC AUTO: 85.4 FL (ref 74–97)
PLATELET # BLD AUTO: 164 K/UL (ref 135–420)
PMV BLD AUTO: 11.6 FL (ref 9.2–11.8)
RBC # BLD AUTO: 2.54 M/UL (ref 4.7–5.5)
WBC # BLD AUTO: 8.5 K/UL (ref 4.6–13.2)

## 2018-03-22 PROCEDURE — 74011250637 HC RX REV CODE- 250/637: Performed by: ORTHOPAEDIC SURGERY

## 2018-03-22 PROCEDURE — 77030012893

## 2018-03-22 PROCEDURE — 85014 HEMATOCRIT: CPT | Performed by: PHYSICIAN ASSISTANT

## 2018-03-22 PROCEDURE — 97110 THERAPEUTIC EXERCISES: CPT

## 2018-03-22 PROCEDURE — 97116 GAIT TRAINING THERAPY: CPT

## 2018-03-22 PROCEDURE — 74011250637 HC RX REV CODE- 250/637: Performed by: HOSPITALIST

## 2018-03-22 PROCEDURE — 85027 COMPLETE CBC AUTOMATED: CPT | Performed by: ORTHOPAEDIC SURGERY

## 2018-03-22 PROCEDURE — 74011636637 HC RX REV CODE- 636/637: Performed by: ORTHOPAEDIC SURGERY

## 2018-03-22 PROCEDURE — 36415 COLL VENOUS BLD VENIPUNCTURE: CPT | Performed by: ORTHOPAEDIC SURGERY

## 2018-03-22 PROCEDURE — 74011250636 HC RX REV CODE- 250/636: Performed by: ORTHOPAEDIC SURGERY

## 2018-03-22 RX ORDER — TRAMADOL HYDROCHLORIDE 50 MG/1
50-100 TABLET ORAL
Qty: 60 TAB | Refills: 0 | Status: SHIPPED | OUTPATIENT
Start: 2018-03-22

## 2018-03-22 RX ORDER — PROMETHAZINE HYDROCHLORIDE 12.5 MG/1
25 TABLET ORAL
Qty: 30 TAB | Refills: 0 | Status: SHIPPED | OUTPATIENT
Start: 2018-03-22 | End: 2018-06-08

## 2018-03-22 RX ORDER — ACETAMINOPHEN 325 MG/1
650 TABLET ORAL 4 TIMES DAILY
Qty: 120 TAB | Refills: 0 | Status: SHIPPED | OUTPATIENT
Start: 2018-03-22

## 2018-03-22 RX ADMIN — PREDNISONE 5 MG: 5 TABLET ORAL at 09:49

## 2018-03-22 RX ADMIN — TACROLIMUS 1 MG: 1 CAPSULE ORAL at 10:01

## 2018-03-22 RX ADMIN — VALGANCICLOVIR 450 MG: 450 TABLET, FILM COATED ORAL at 10:35

## 2018-03-22 RX ADMIN — PANTOPRAZOLE SODIUM 40 MG: 40 TABLET, DELAYED RELEASE ORAL at 09:49

## 2018-03-22 RX ADMIN — ACETAMINOPHEN 650 MG: 325 TABLET ORAL at 13:21

## 2018-03-22 RX ADMIN — ENOXAPARIN SODIUM 30 MG: 30 INJECTION SUBCUTANEOUS at 00:20

## 2018-03-22 RX ADMIN — PROPRANOLOL HYDROCHLORIDE 20 MG: 20 TABLET ORAL at 09:48

## 2018-03-22 RX ADMIN — POLYETHYLENE GLYCOL 3350 17 G: 17 POWDER, FOR SOLUTION ORAL at 09:50

## 2018-03-22 RX ADMIN — ACETAMINOPHEN 650 MG: 325 TABLET ORAL at 02:09

## 2018-03-22 RX ADMIN — TRAMADOL HYDROCHLORIDE 50 MG: 50 TABLET, FILM COATED ORAL at 00:19

## 2018-03-22 RX ADMIN — Medication 400 MG: at 09:49

## 2018-03-22 RX ADMIN — ATORVASTATIN CALCIUM 20 MG: 20 TABLET, FILM COATED ORAL at 09:48

## 2018-03-22 RX ADMIN — CALCIUM CARBONATE-VITAMIN D TAB 500 MG-200 UNIT 1 TABLET: 500-200 TAB at 09:48

## 2018-03-22 RX ADMIN — Medication 10 ML: at 05:32

## 2018-03-22 RX ADMIN — TRAMADOL HYDROCHLORIDE 50 MG: 50 TABLET, FILM COATED ORAL at 09:48

## 2018-03-22 RX ADMIN — MULTIPLE VITAMINS W/ MINERALS TAB 1 TABLET: TAB at 09:48

## 2018-03-22 NOTE — PROGRESS NOTES
Problem: Mobility Impaired (Adult and Pediatric)  Goal: *Acute Goals and Plan of Care (Insert Text)  In 1-7 days pt will be able to perform:  ST.  Bed mobility:  Rolling L to R to L modified independent for positioning. 2.  Supine to sit to supine S with HR for meals. 3.  Sit to stand to sit S with RW in prep for ambulation. LT.  Gait:  Ambulate >150ft S with RW, WBAT, for home/community mobility. 2.  Stair Negotiation:  Ascend/descend >3 steps CGA with HR for home entry. 3.  Activity tolerance: Tolerate up in chair 1-2 hours for ADLs. 4.  Patient/Family Education:  Patient/family to be independent with HEP for follow-up care and safe discharge. physical Therapy TREATMENT    Patient: Iron Zafar (85 y.o. male)  Date: 3/22/2018  Diagnosis: CHONDROMALACIA, PATELLA, CAD  DJD (degenerative joint disease) of knee <principal problem not specified>  Procedure(s) (LRB):  BILATERAL PATELLA FEMORAL JOINT ARTHROPLASTY **SPEC POP** (Bilateral) 3 Days Post-Op  Precautions: Fall, WBAT   Chart, physical therapy assessment, plan of care and goals were reviewed. ASSESSMENT:  Pt making slow progress toward PT goals at this time. Pt continues to require assistance with all bed mobility and functional transfers. During gait training pt required Dania with RW/GB with a slow/antalgic gait pattern with slight L knee buckling. Left pt in a chair with all needs met and call bell within reach. OT and wife in the room. Pt's wife insistent pt go home, even through rehab is recommended. Pt would require medical transport to go home as pt NOT safe to get into a car or upstairs. Progression toward goals:  []      Improving appropriately and progressing toward goals  [x]      Improving slowly and progressing toward goals  []      Not making progress toward goals and plan of care will be adjusted     PLAN:  Patient continues to benefit from skilled intervention to address the above impairments.   Continue treatment per established plan of care. Discharge Recommendations:  Rehab vs. Home Health  Further Equipment Recommendations for Discharge:  rolling walker     SUBJECTIVE:   Patient stated I am feeling really weak.     OBJECTIVE DATA SUMMARY:   Critical Behavior:  Neurologic State: Alert, Appropriate for age  Orientation Level: Appropriate for age, Oriented X4  Cognition: Appropriate decision making, Appropriate for age attention/concentration, Appropriate safety awareness, Follows commands  Safety/Judgement: Awareness of environment, Fall prevention, Good awareness of safety precautions, Insight into deficits  Functional Mobility Training:  Bed Mobility:  Rolling: Supervision  Supine to Sit: Minimum assistance; Additional time   Transfers:  Sit to Stand: Minimum assistance; Additional time  Stand to Sit: Minimum assistance; Additional time  Balance:  Sitting: Intact  Standing: Impaired; With support  Standing - Static: Fair  Standing - Dynamic : Poor  Ambulation/Gait Training:  Distance (ft): 70 Feet (ft)  Assistive Device: Walker, rolling;Gait belt  Ambulation - Level of Assistance: Moderate assistance   Gait Abnormalities: Antalgic;Decreased step clearance  Right Side Weight Bearing: As tolerated  Left Side Weight Bearing: As tolerated  Base of Support: Narrowed  Stance: Weight shift  Speed/Ju: Slow;Shuffled  Step Length: Right shortened;Left shortened  Swing Pattern: Right asymmetrical;Left asymmetrical   Interventions: Visual/Demos; Verbal cues; Tactile cues; Safety awareness training   Therapeutic Exercises:   HEP included ankle pumps, heel slides, AAROM LAQ, knee flexion, AAROM marching  Pain:  Pain Scale 1: Numeric (0 - 10)  Pain Intensity 1: 5  Pain Location 1: Knee  Pain Orientation 1: Right;Left  Pain Description 1: Aching  Pain Intervention(s) 1: Cold pack; Rest;Repositioned  Activity Tolerance:   Fair-  Please refer to the flowsheet for vital signs taken during this treatment.   After treatment:   [x] Patient left in no apparent distress sitting up in chair  [] Patient left in no apparent distress in bed  [x] Call bell left within reach  [x] Nursing notified  [x] Caregiver present  [] Bed alarm activated      Dominic Ennis PT, DPT     Time Calculation: 30 mins

## 2018-03-22 NOTE — PROGRESS NOTES
5418: Received report from Jocelyne ELIZABETH RN. Assumed pt care at this time. 1623: Assessment completed. Patient is A&OX4. Patient is calm and cooperative. Family at bedside. Patient's oral mucosa pink and moist, strong  on both upper extremities. Lung sound clear, not appear distress. Bowel sounds active. Pedal pulses are palpable, no complain of any numbness or tingling. IV site dry and dressing intact. ABD pads dressing to knee bilaterally is clean and dry, Ice is applied. SCD and Luis hose are applied. Pain is 5/10.  bed is in lower position, call bell and personal items are within reach. Pain regimen and activities are educated, educated pt to call when getting up to prevent fall. Pt verbalized understanding. 1400: Medical transportation is called and pt and spouse is notified. 1425: Discharge dual review with Whitney OCONNOR RN. Discharge instruction is reviewed with pt and family. Allowed time to ask question, pt verbalized understanding. Pain medication is given prior discharge, IV is removed at this time and pt tolerated well.

## 2018-03-22 NOTE — PROGRESS NOTES
Pt refused PT due to:    []  Nausea/vomiting  []  Eating  []  Pain  []  Pt lethargic  [x]  Pt expecting to be discharged to home soon;  Ples Flow, RN calling for medical transport. Will f/u later as schedule allows. Thank you.     Jennifer Ennis PT, DPT

## 2018-03-22 NOTE — PROGRESS NOTES
Hospitalist Progress Note    Patient: Ximena Cordero MRN: [id number]  CSN: [id number]    YOB: 1933  Age: 80 y.o. Sex: male    DOA: 3/19/2018 LOS:  LOS: 3 days          Chief Complaint:    Cons Medical Management    Assessment/Plan     1. S/P Bilateral Patella Femoral Joint Arthroplasty  2. CAD  3. CKD  4. Hx of Lung Transplant  5. Anemia  6. Constipation    1. PT/OT, DC Planning, DVT prophy per primary team, continue routine postoperative care and follow up. 2. No chest pain at time of exam. Continue routine medications. 3. Cr is stable, recommend PCP follow up for continued management and surveillance. 4. Continue anti-rejection medications. Patient has routine follow up with his pulmonologist at [facility]. 5. Hgb 6.6 this AM, repeat shows 7.2. Will hold off on blood transfusion at this time. Recommend that the patient begin iron supplementation for now, and follow up with his PCP for management and surveillance. 6. Miralax, may continue upon discharge. At this point, patient is medically stable for discharge. Continue with the above recommendations of iron supplementation, PCP follow up and routine follow up with his pulmonologist at [facility]. Patient Active Problem List   Diagnosis Code    CAD (coronary artery disease) I25.10    Chronic kidney disease N18.9       Subjective:    Feeling well today. Sore knees, but no pain.      Review of systems:    Constitutional: denies fevers, chills, myalgias  Respiratory: denies SOB, cough  Cardiovascular: denies chest pain, palpitations  Gastrointestinal: denies nausea, vomiting, diarrhea      Vital signs/Intake and Output:  Visit Vitals    /53 (BP 1 Location: Right arm, BP Patient Position: Sitting)    Pulse 65    Temp 97.7 °F (36.5 °C)    Resp 15    Ht 5' 9\" (1.753 m)    Wt 71.7 kg (158 lb)    SpO2 100%    BMI 23.33 kg/m2     Current Shift:  03/22 0701 - 03/22 1900  In: 120 [P.O.:120]  Out: 400 [Urine:400]  Last three shifts: 03/20 1901 - 03/22 0700  In: 1440 [P.O.:1440]  Out: 1150 [Urine:1150]    Exam:    General: Elderly male, alert, NAD, OX3  Head/Neck: NCAT, supple, No masses, No lymphadenopathy  CVS:Regular rate and rhythm, no M/R/G, S1/S2 heard, no thrill  Lungs:Clear to auscultation bilaterally, no wheezes, rhonchi, or rales  Abdomen: Soft, Nontender, No distention, Normal Bowel sounds, No hepatomegaly  Extremities: Bilateral knees dressed, compression stockings bilaterally  Skin:normal texture and turgor, no rashes, no lesions  Neuro:grossly normal , follows commands  Psych:appropriate                Labs: Results:       Chemistry Recent Labs      03/21/18   0255  03/20/18   0400   GLU  109*  143*   NA  135*  139   K  5.0  5.1   CL  102  105   CO2  25  24   BUN  46*  36*   CREA  2.90*  2.16*   CA  8.2*  8.6   AGAP  8  10   BUCR  16  17      CBC w/Diff Recent Labs      03/22/18   1045  03/22/18   0346  03/21/18   0255   WBC   --   8.5  9.8   RBC   --   2.54*  2.66*   HGB  7.2*  6.6*  7.0*   HCT  23.3*  21.7*  22.8*   PLT   --   164  159   GRANS   --    --   74*   LYMPH   --    --   13*   EOS   --    --   1      Cardiac Enzymes No results for input(s): CPK, CKND1, FRANKY in the last 72 hours. No lab exists for component: CKRMB, TROIP   Coagulation No results for input(s): PTP, INR, APTT in the last 72 hours. No lab exists for component: INREXT    Lipid Panel No results found for: CHOL, CHOLPOCT, CHOLX, CHLST, CHOLV, 152060, HDL, LDL, LDLC, DLDLP, 674800, VLDLC, VLDL, TGLX, TRIGL, TRIGP, TGLPOCT, CHHD, CHHDX   BNP No results for input(s): BNPP in the last 72 hours. Liver Enzymes No results for input(s): TP, ALB, TBIL, AP, SGOT, GPT in the last 72 hours.     No lab exists for component: DBIL   Thyroid Studies No results found for: T4, T3U, TSH, TSHEXT     Procedures/imaging: see electronic medical records for all procedures/Xrays and details which were not copied into this note but were reviewed prior to creation of [id number]son, PA-C

## 2018-03-22 NOTE — PROGRESS NOTES
20:00 Assessment completed. Lungs are clear bilat. Abd pads on knees remain C/D/I with + CMS & + PP. Ice packs are refilled & re-applied. Denies pain or discomfort in calves. Resting quietly in bed talking on the phone & watching TV.    22:30 Shift assessment completed. See nsg flow sheet for details. 02:55 Reassessed with  0 changes noted. Bilat knee dsg's remain C/D/I with + CMS & +PP. Continues to deny pain or discomfort in calves. Resting quietly in bed with eyes closed between cares x for voiding per urinal w/o difficulty. 06:30 Dr Cali Ortega paged re Hgb of 6.6. Will have primary Dr evaluate lab results. 07:05  Bedside and Verbal shift change report given to Danielle Solorzano RN (oncoming nurse) by Oscar Villarreal RN (offgoing nurse). Report included the following information SBAR.

## 2018-03-22 NOTE — DISCHARGE SUMMARY
Discharge Summary    Patient: Iron Zafar               Sex: male          DOA: 3/19/2018         YOB: 1933      Age:  80 y.o.        LOS:  LOS: 3 days                Admit Date: 3/19/2018    Discharge Date: 3/22/2018    Admission Diagnoses: CHONDROMALACIA, PATELLA, CAD  DJD (degenerative joint disease) of knee    Discharge Diagnoses:    Problem List as of 3/22/2018  Date Reviewed: 3/22/2018          Codes Class Noted - Resolved    Chronic kidney disease ICD-10-CM: N18.9  ICD-9-CM: 203. 9  Unknown - Present    Overview Signed 3/19/2018  8:43 PM by Matthew Ko MD     borderline kidney failure, chronic             CAD (coronary artery disease) ICD-10-CM: I25.10  ICD-9-CM: 414.00  1/1/2008 - Present    Overview Signed 3/19/2018  8:43 PM by Matthew Ko MD     blockage             RESOLVED: DJD (degenerative joint disease) of knee ICD-10-CM: M17.10  ICD-9-CM: 715.36  3/19/2018 - 3/22/2018        RESOLVED: S/P lung transplant ([facility] ICD-10-CM: Z94.2  ICD-9-CM: V42.6  3/19/2018 - 3/22/2018              Discharge Condition: Good    Hospital Course: wilder PFA    Consults: Hospitalist    Significant Diagnostic Studies: neg    Discharge Medications:     Current Discharge Medication List      START taking these medications    Details   traMADol (ULTRAM) 50 mg tablet Take 1-2 Tabs by mouth every six (6) hours as needed. Max Daily Amount: 400 mg. Qty: 60 Tab, Refills: 0    Associated Diagnoses: S/P lung transplant ([facility]      promethazine (PHENERGAN) 12.5 mg tablet Take 2 Tabs by mouth every six (6) hours as needed for Nausea. Qty: 30 Tab, Refills: 0    Associated Diagnoses: S/P lung transplant ([facility]         CONTINUE these medications which have CHANGED    Details   acetaminophen (TYLENOL) 325 mg tablet Take 2 Tabs by mouth four (4) times daily.   Qty: 120 Tab, Refills: 0    Associated Diagnoses: Primary osteoarthritis involving multiple joints; S/P lung transplant ([facility]         CONTINUE these medications which have NOT CHANGED    Details   tacrolimus (PROGRAF) 1 mg capsule Take  by mouth two (2) times a day. Indications: 1mg in am, 1.5 mg in pm      predniSONE (DELTASONE) 5 mg tablet Take  by mouth daily. 3 tabs every day      propranolol (INDERAL) 10 mg tablet Take 20 mg by mouth two (2) times a day. 10 mg q am and 20 mg pm       atorvastatin (LIPITOR) 20 mg tablet Take 20 mg by mouth daily. valGANciclovir (VALCYTE) 450 mg tablet Take 450 mg by mouth daily. Indications: takes on sunday and thursday      magnesium oxide (MAG-OX) 400 mg tablet Take 400 mg by mouth daily. multivitamin (ONE A DAY) tablet Take 1 Tab by mouth daily. pantoprazole (PROTONIX) 40 mg tablet Take 40 mg by mouth daily. calcium-cholecalciferol, D3, (CALTRATE 600+D) tablet Take 1 Tab by mouth two (2) times a day. aspirin 81 mg tablet Take 81 mg by mouth. cetirizine (ZYRTEC) 10 mg tablet Take 10 mg by mouth daily as needed for Allergies. SULFAMETHOXAZOLE/TRIMETHOPRIM (SEPTRA PO) Take  by mouth daily.  Indications: only on Monday and Friday             Activity: Activity as tolerated    Diet: Regular Diet    Wound Care: Keep wound clean and dry    Follow-up: 2 weeks

## 2018-03-22 NOTE — PROGRESS NOTES
Chart reviewed, noted discharge,spoke with PT and with pt and wife. Pt planning discharge home, will need medical transport as he has not cleared PT for safety on stairs to home. Wife aware she may have to pay for cost of transport, ambulance service may want payment in advance given distance to pt home. Pt lives in [place]. [facility] made aware of discharge home today, will plan to see pt tomorrow. No other needs at this time, CM remains available.

## 2018-03-22 NOTE — PROGRESS NOTES
Problem: Falls - Risk of  Goal: *Absence of Falls  Document Wali Fall Risk and appropriate interventions in the flowsheet.    Outcome: Progressing Towards Goal  Fall Risk Interventions:  Mobility Interventions: Patient to call before getting OOB, Utilize walker, cane, or other assitive device    Mentation Interventions: Adequate sleep, hydration, pain control    Medication Interventions: Assess postural VS orthostatic hypotension, Patient to call before getting OOB, Teach patient to arise slowly    Elimination Interventions: Call light in reach, Patient to call for help with toileting needs

## 2018-03-22 NOTE — DISCHARGE INSTRUCTIONS
DISCHARGE SUMMARY from Nurse    PATIENT INSTRUCTIONS:    After general anesthesia or intravenous sedation, for 24 hours or while taking prescription Narcotics:  · Limit your activities       Total Knee Replacement: What to Expect at [address] [place] [address]    When you leave the hospital, you should be able to move around with a walker or crutches. But you will need someone to help you at home for the next few weeks or until you have more energy and can move around better. If there is no one to help you at home, you may go to a rehabilitation center. You will go home with a bandage and stitches or staples. Change the bandage as your doctor tells you to. Your doctor will remove your stitches or staples 10 to 21 days after your surgery. You may still have some mild pain, and the area may be swollen for 3 to 6 months after surgery. Your knee will continue to improve for 6 to 12 months. You will probably use a walker for 1 to 3 weeks and then use crutches. When you are ready, you can use a cane. You will probably be able to walk on your own in 4 to 8 weeks. You will need to do months of physical rehabilitation (rehab) after a knee replacement. Rehab will help you strengthen the muscles of the knee and help you regain movement. After you recover, your artificial knee will allow you to do normal daily activities with less pain or no pain at all. You may be able to hike, dance, ride a bike, and play golf. Talk to your doctor about whether you can do more strenuous activities. Always tell your caregivers that you have an artificial knee. How long it will take to walk on your own, return to normal activities, and go back to work depends on your health and how well your rehabilitation (rehab) program goes. The better you do with your rehab exercises, the quicker you will get your strength and movement back. This care sheet gives you a general idea about how long it will take for you to recover.  But each person recovers at a different pace. Follow the steps below to get better as quickly as possible. How can you care for yourself at home? Activity  ? Rest when you feel tired. You may take a nap, but do not stay in bed all day. When you sit, use a chair with arms. You can use the arms to help you stand up. ? Work with your physical therapist to find the best way to exercise. You may be able to take frequent, short walks using crutches or a walker. What you can do as your knee heals will depend on whether your new knee is cemented or uncemented. You may not be able to do certain things for a while if your new knee is uncemented. ? After your knee has healed enough, you can do more strenuous activities with caution. You can golf, but use a golf cart, and do not wear shoes with spikes. You can bike on a flat road or on a stationary bike. Avoid biking up hills. Your doctor may suggest that you stay away from activities that put stress on your knee. These include tennis or badminton, squash or racquetball, contact sports like football, jumping (such as in basketball), jogging, or running. Avoid activities where you might fall. These include horseback riding, skiing, and mountain biking. ? Do not sit for more than 1 hour at a time. Get up and walk around for a while before you sit again. If you must sit for a long time, prop up your leg with a chair or footstool. This will help you avoid swelling. ? Ask your doctor when you can shower. You may need to take sponge baths until your stitches or staples have been removed. ? Ask your doctor when you can drive again. It may take up to 8 weeks after knee replacement surgery before it is safe for you to drive. ? When you get into a car, sit on the edge of the seat. Then pull in your legs, and turn to face the front. ? You should be able to do many everyday activities 3 to 6 weeks after your surgery. You will probably need to take 4 to 16 weeks off from work.  When you can go back to work depends on the type of work you do and how you feel. ? Ask your doctor when it is okay for you to have sex. ? Do not lift anything heavier than 10 pounds and do not lift weights for 12 weeks. Diet  ? By the time you leave the hospital, you should be eating your normal diet. If your stomach is upset, try bland, low-fat foods like plain rice, broiled chicken, toast, and yogurt. Your doctor may suggest that you take iron and vitamin supplements. ? Drink plenty of fluids (unless your doctor tells you not to). ? Eat healthy foods, and watch your portion sizes. Try to stay at your ideal weight. Too much weight puts more stress on your new knee. ? You may notice that your bowel movements are not regular right after your surgery. This is common. Try to avoid constipation and straining with bowel movements. You may want to take a fiber supplement every day. If you have not had a bowel movement after a couple of days, ask your doctor about taking a mild laxative. Medicines  ? Your doctor will tell you if and when you can restart your medicines. He or she will also give you instructions about taking any new medicines. ? If you take blood thinners, such as warfarin (Coumadin), clopidogrel (Plavix), or aspirin, be sure to talk to your doctor. He or she will tell you if and when to start taking those medicines again. Make sure that you understand exactly what your doctor wants you to do.   ? Your doctor may give you a blood-thinning medicine to prevent blood clots. If you take a blood thinner, be sure you get instructions about how to take your medicine safely. Blood thinners can cause serious bleeding problems. This medicine could be in pill form or as a shot (injection). If a shot is necessary, your doctor will tell you how to do this. ? Be safe with medicines. Take pain medicines exactly as directed. If the doctor gave you a prescription medicine for pain, take it as prescribed.   If you are not taking a prescription pain medicine, ask your doctor if you can take an over-the-counter medicine. Plan to take your pain medicine 30 minutes before exercises. It is easier to prevent pain before it starts than to stop it once it has started. ? If you think your pain medicine is making you sick to your stomach: Take your medicine after meals (unless your doctor has told you not to). Ask your doctor for a different pain medicine. ? If your doctor prescribed antibiotics, take them as directed. Do not stop taking them just because you feel better. You need to take the full course of antibiotics. Incision care  ? You will have a bandage over the cut (incision) and staples or stitches. Take the bandage off when your doctor says it is okay. ? Your doctor will remove the staples or stitches 10 days to 3 weeks after the surgery and replace them with strips of tape. Leave the tape on for a week or until it falls off. Exercise  ? Your rehab program will give you a number of exercises to do to help you get back your knee's range of motion and strength. Always do them as your therapist tells you. Ice and elevation  ? For pain and swelling, put ice or a cold pack on the area for 10 to 20 minutes at a time. Put a thin cloth between the ice and your skin. Other instructions  ? Continue to wear your support stockings as your doctor says. These help to prevent blood clots. The length of time that you will have to wear them depends on your activity level and the amount of swelling. ? You have metal pieces in your knee. These may set off some airport metal detectors. Carry a medical alert card that says you have an artificial joint, just in case. Follow-up care is a key part of your treatment and safety. Be sure to make and go to all appointments, and call your doctor if you are having problems. It's also a good idea to know your test results and keep a list of the medicines you take. When should you call for help?   Call 911 anytime you think you may need emergency care. For example, call if:  ? You passed out (lost consciousness). ? You have severe trouble breathing. ? You have sudden chest pain and shortness of breath, or you cough up blood. ?Call your doctor now or seek immediate medical care if:  ? You have signs of infection, such as: Increased pain, swelling, warmth, or redness. Red streaks leading from the incision. Pus draining from the incision. A fever. ? You have signs of a blood clot, such as:  Pain in your calf, back of the knee, thigh, or groin. Redness and swelling in your leg or groin. ? Your incision comes open and begins to bleed, or the bleeding increases. ? You have pain that does not get better after you take pain medicine. ? Watch closely for changes in your health, and be sure to contact your doctor if:  ? You do not have a bowel movement after taking a laxative. Where can you learn more? Go to http://scout-navid.info/. Enter A813 in the search box to learn more about \"Total Knee Replacement: What to Expect at Home. \"  Current as of: March 21, 2017  Content Version: 11.4  © 6645-4570 [employer]. Care instructions adapted under license by [employer] (which disclaims liability or warranty for this information). If you have questions about a medical condition or this instruction, always ask your healthcare professional. [place] [address] any warranty or liability for your use of this information. ·   · Do not drive and operate hazardous machinery  · Do not make important personal or business decisions  · Do  not drink alcoholic beverages  · If you have not urinated within 8 hours after discharge, please contact your surgeon on call.     Report the following to your surgeon:  · Excessive pain, swelling, redness or odor of or around the surgical area  · Temperature over 100.5  · Nausea and vomiting lasting longer than 4 hours or if unable to take medications  · Any signs of decreased circulation or nerve impairment to extremity: change in color, persistent  numbness, tingling, coldness or increase pain  · Any questions    What to do at Home:      *  Please give a list of your current medications to your Primary Care Provider. *  Please update this list whenever your medications are discontinued, doses are      changed, or new medications (including over-the-counter products) are added. *  Please carry medication information at all times in case of emergency situations. These are general instructions for a healthy lifestyle:    No smoking/ No tobacco products/ Avoid exposure to second hand smoke  Surgeon General's Warning:  Quitting smoking now greatly reduces serious risk to your health. Obesity, smoking, and sedentary lifestyle greatly increases your risk for illness    A healthy diet, regular physical exercise & weight monitoring are important for maintaining a healthy lifestyle    You may be retaining fluid if you have a history of heart failure or if you experience any of the following symptoms:  Weight gain of 3 pounds or more overnight or 5 pounds in a week, increased swelling in our hands or feet or shortness of breath while lying flat in bed. Please call your doctor as soon as you notice any of these symptoms; do not wait until your next office visit. Recognize signs and symptoms of STROKE:    F-face looks uneven    A-arms unable to move or move unevenly    S-speech slurred or non-existent    T-time-call 911 as soon as signs and symptoms begin-DO NOT go       Back to bed or wait to see if you get better-TIME IS BRAIN. Warning Signs of HEART ATTACK     Call 911 if you have these symptoms:   Chest discomfort. Most heart attacks involve discomfort in the center of the chest that lasts more than a few minutes, or that goes away and comes back.  It can feel like uncomfortable pressure, squeezing, fullness, or pain.   Discomfort in other areas of the upper body. Symptoms can include pain or discomfort in one or both arms, the back, neck, jaw, or stomach.  Shortness of breath with or without chest discomfort.  Other signs may include breaking out in a cold sweat, nausea, or lightheadedness. Don't wait more than five minutes to call 911 - MINUTES MATTER! Fast action can save your life. Calling 911 is almost always the fastest way to get lifesaving treatment. Emergency Medical Services staff can begin treatment when they arrive -- up to an hour sooner than if someone gets to the hospital by car. The discharge information has been reviewed with the patient and spouse. The patient and spouse verbalized understanding. Discharge medications reviewed with the patient and spouse and appropriate educational materials and side effects teaching were provided.   ___________________________________________________________________________________________________________________________________

## 2018-04-02 NOTE — OP NOTES
[facility] [place]  OPERATIVE REPORT    Beatrice Zheng  MR#: [id number]  : 1933  ACCOUNT #: [de-identified]   DATE OF SERVICE: 2018    REDICTATION    PREOPERATIVE DIAGNOSIS:  Bilateral knee severe patellofemoral DJD. POSTOPERATIVE DIAGNOSIS:  Bilateral knee severe patellofemoral DJD. PROCEDURE PERFORMED:  Bilateral knee patellofemoral arthroplasty. SURGEON:  Morena Buck MD.    ASSISTANT:  None. ANESTHESIOLOGIST:  Dr. Yohannes Arredondo.      ANESTHESIA:  General anesthesia. ESTIMATED BLOOD LOSS:  Minimal.    SPECIMENS REMOVED:  There was no specimen. COMPLICATIONS:  No complications. IMPLANTS:  Llamas and Nephew patellofemoral arthroplasties, left and right knee. INDICATION:  An 80-year-old male with severe patellofemoral arthritis bilaterally. DESCRIPTION OF PROCEDURE:  The patient was brought to the operating room after receiving antibiotics. General anesthesia was administered. Tourniquets were applied to both thighs. Beginning on the right knee, the right lower extremity was exsanguinated, tourniquet inflated to 350 mmHg. Midline incision was made from the superior pole of the patella to the mid patellar tendon. Flaps were developed. Medial arthrotomy was performed. The patella which was very arthritic was inverted and then cut and drilled to accept a 35 mm oval patella. A temporary button was placed and attention was turned to the trochlea. Utilizing the appropriate Smith and Anthony-Sterling, the trochlea was prepared to accept the actual trochlea. Trials were placed, appropriate drill holes were placed, appropriate burs were used to bur out the trochlea. A trial reduction was made with a trial trochlea and the trial patella, which showed good mobility. At this point, cement was prepared. The trochlea was cemented as was the patella button. Excess cement was removed, allowed to cure. The knee was put through a full range of motion.   A long-acting local had been injected. Capsule was closed with Vicryl, the skin was closed with subcuticular sutures, and a Prineo closure system. Tourniquet was released with normal filling distally. A cover was applied while attention was turned to the left lower extremity. In a like fashion, the extremity was exsanguinated, tourniquet inflated. Incision was made from the superior pole of the patella extending care home down the patellar tendon. Flaps were developed. Medial arthrotomy was performed. The patella was everted, cut, and drilled to accept a 35 mm oval patella. In like fashion, the trochlea was prepared to accept the same size trochlea component. At this point, cement was used to cement the trochlea and the patella. After the cement cured, the knee was put through a full range of motion. Wound was irrigated. Long-acting local was injected. The arthrotomy was closed with Vicryl, the subQ with Vicryl, and then the skin was closed with a Prineo skin closure system. At this point, light dressings were applied to both knees. The patient was awoken and went to recovery in stable condition.       MD CARMELO Polanco / CLARENCE  D: 04/02/2018 15:17     T: 04/02/2018 16:28  JOB #: [id number]

## 2018-05-07 ENCOUNTER — HOSPITAL ENCOUNTER (OUTPATIENT)
Dept: MRI IMAGING | Age: 83
Discharge: HOME | End: 2018-05-07
Attending: ORTHOPAEDIC SURGERY
Payer: MEDICARE

## 2018-05-07 DIAGNOSIS — M54.50 LOW BACK PAIN: ICD-10-CM

## 2018-05-07 PROCEDURE — 72148 MRI LUMBAR SPINE W/O DYE: CPT

## 2018-06-08 RX ORDER — LANOLIN ALCOHOL/MO/W.PET/CERES
CREAM (GRAM) TOPICAL 2 TIMES DAILY
COMMUNITY

## 2018-06-11 ENCOUNTER — HOSPITAL ENCOUNTER (OUTPATIENT)
Age: 83
Setting detail: OUTPATIENT SURGERY
Discharge: HOME | End: 2018-06-11
Attending: INTERNAL MEDICINE | Admitting: INTERNAL MEDICINE
Payer: MEDICARE

## 2018-06-11 ENCOUNTER — ANESTHESIA (OUTPATIENT)
Dept: ENDOSCOPY | Age: 83
End: 2018-06-11
Payer: MEDICARE

## 2018-06-11 ENCOUNTER — ANESTHESIA EVENT (OUTPATIENT)
Dept: ENDOSCOPY | Age: 83
End: 2018-06-11
Payer: MEDICARE

## 2018-06-11 VITALS
SYSTOLIC BLOOD PRESSURE: 132 MMHG | DIASTOLIC BLOOD PRESSURE: 72 MMHG | TEMPERATURE: 97.8 F | BODY MASS INDEX: 22.48 KG/M2 | WEIGHT: 157 LBS | HEIGHT: 70 IN | RESPIRATION RATE: 18 BRPM | HEART RATE: 69 BPM | OXYGEN SATURATION: 100 %

## 2018-06-11 LAB
H PYLORI FROM TISSUE: NEGATIVE
KIT LOT NO., HCLOLOT: NORMAL
NEGATIVE CONTROL: NEGATIVE
POSITIVE CONTROL: POSITIVE

## 2018-06-11 PROCEDURE — 74011250636 HC RX REV CODE- 250/636

## 2018-06-11 PROCEDURE — 74011250636 HC RX REV CODE- 250/636: Performed by: INTERNAL MEDICINE

## 2018-06-11 PROCEDURE — 87077 CULTURE AEROBIC IDENTIFY: CPT | Performed by: INTERNAL MEDICINE

## 2018-06-11 PROCEDURE — 76060000032 HC ANESTHESIA 0.5 TO 1 HR: Performed by: INTERNAL MEDICINE

## 2018-06-11 PROCEDURE — 74011000250 HC RX REV CODE- 250

## 2018-06-11 PROCEDURE — 76040000007: Performed by: INTERNAL MEDICINE

## 2018-06-11 PROCEDURE — 77030009426 HC FCPS BIOP ENDOSC BSC -B: Performed by: INTERNAL MEDICINE

## 2018-06-11 PROCEDURE — 88305 TISSUE EXAM BY PATHOLOGIST: CPT | Performed by: INTERNAL MEDICINE

## 2018-06-11 RX ORDER — SODIUM CHLORIDE 0.9 % (FLUSH) 0.9 %
5-10 SYRINGE (ML) INJECTION EVERY 8 HOURS
Status: DISCONTINUED | OUTPATIENT
Start: 2018-06-11 | End: 2018-06-11 | Stop reason: HOSPADM

## 2018-06-11 RX ORDER — LIDOCAINE HYDROCHLORIDE 20 MG/ML
INJECTION, SOLUTION EPIDURAL; INFILTRATION; INTRACAUDAL; PERINEURAL AS NEEDED
Status: DISCONTINUED | OUTPATIENT
Start: 2018-06-11 | End: 2018-06-11

## 2018-06-11 RX ORDER — DEXTROMETHORPHAN/PSEUDOEPHED 2.5-7.5/.8
1.2 DROPS ORAL
Status: DISCONTINUED | OUTPATIENT
Start: 2018-06-11 | End: 2018-06-11 | Stop reason: HOSPADM

## 2018-06-11 RX ORDER — ATROPINE SULFATE 0.1 MG/ML
0.5 INJECTION INTRAVENOUS
Status: DISCONTINUED | OUTPATIENT
Start: 2018-06-11 | End: 2018-06-11 | Stop reason: HOSPADM

## 2018-06-11 RX ORDER — SODIUM CHLORIDE 0.9 % (FLUSH) 0.9 %
5-10 SYRINGE (ML) INJECTION AS NEEDED
Status: DISCONTINUED | OUTPATIENT
Start: 2018-06-11 | End: 2018-06-11 | Stop reason: HOSPADM

## 2018-06-11 RX ORDER — FLUMAZENIL 0.1 MG/ML
0.2 INJECTION INTRAVENOUS
Status: DISCONTINUED | OUTPATIENT
Start: 2018-06-11 | End: 2018-06-11 | Stop reason: HOSPADM

## 2018-06-11 RX ORDER — PROPOFOL 10 MG/ML
INJECTION, EMULSION INTRAVENOUS AS NEEDED
Status: DISCONTINUED | OUTPATIENT
Start: 2018-06-11 | End: 2018-06-11 | Stop reason: HOSPADM

## 2018-06-11 RX ORDER — SODIUM CHLORIDE 9 MG/ML
100 INJECTION, SOLUTION INTRAVENOUS CONTINUOUS
Status: DISCONTINUED | OUTPATIENT
Start: 2018-06-11 | End: 2018-06-11 | Stop reason: HOSPADM

## 2018-06-11 RX ORDER — MIDAZOLAM HYDROCHLORIDE 1 MG/ML
1-2 INJECTION, SOLUTION INTRAMUSCULAR; INTRAVENOUS
Status: DISCONTINUED | OUTPATIENT
Start: 2018-06-11 | End: 2018-06-11 | Stop reason: HOSPADM

## 2018-06-11 RX ORDER — LIDOCAINE HYDROCHLORIDE 20 MG/ML
INJECTION, SOLUTION EPIDURAL; INFILTRATION; INTRACAUDAL; PERINEURAL AS NEEDED
Status: DISCONTINUED | OUTPATIENT
Start: 2018-06-11 | End: 2018-06-11 | Stop reason: HOSPADM

## 2018-06-11 RX ADMIN — SODIUM CHLORIDE 100 ML/HR: 900 INJECTION, SOLUTION INTRAVENOUS at 14:36

## 2018-06-11 RX ADMIN — PROPOFOL 200 MG: 10 INJECTION, EMULSION INTRAVENOUS at 15:17

## 2018-06-11 RX ADMIN — LIDOCAINE HYDROCHLORIDE 80 MG: 20 INJECTION, SOLUTION EPIDURAL; INFILTRATION; INTRACAUDAL; PERINEURAL at 14:52

## 2018-06-11 NOTE — PROGRESS NOTES
Anesthesia reports 200mg Propofol, 80mg Lidocaine and 400mL NS given during procedure. Received report from anesthesia staff on vital signs and status of patient. Endoscope was pre-cleaned at the bedside immediately following procedure by Eric Solitario.

## 2018-06-11 NOTE — H&P
Helen Richards MD  [facility], [address] [place] [address]  [phone]  [email or web address]      See office H and P. No interval change. Date of Surgery Update:  Rach Chan was seen and examined. History and physical has been reviewed. The patient has been examined.  There have been no significant clinical changes since the completion of the originally dated History and Physical.    Signed By: Roula Crowley MD     June 11, 2018 2:57 PM

## 2018-06-11 NOTE — ANESTHESIA POSTPROCEDURE EVALUATION
Post-Anesthesia Evaluation and Assessment    Patient: Momo Newsome MRN: [id number]  SSN: [id number]    YOB: 1933  Age: 80 y.o. Sex: male       Cardiovascular Function/Vital Signs  Visit Vitals    /71    Pulse (!) 59    Temp 36.6 °C (97.8 °F)    Resp 16    Ht 5' 10\" (1.778 m)    Wt 71.2 kg (157 lb)    SpO2 99%    BMI 22.53 kg/m2       Patient is status post total IV anesthesia anesthesia for Procedure(s):  ESOPHAGOGASTRODUODENOSCOPY (EGD)  ESOPHAGOGASTRODUODENAL (EGD) BIOPSY  COLONOSCOPY. Nausea/Vomiting: None    Postoperative hydration reviewed and adequate. Pain:  Pain Scale 1: Numeric (0 - 10) (06/11/18 1543)  Pain Intensity 1: 0 (06/11/18 1543)   Managed    Neurological Status: At baseline    Mental Status and Level of Consciousness: Arousable    Pulmonary Status:   O2 Device: Room air (06/11/18 1543)   Adequate oxygenation and airway patent    Complications related to anesthesia: None    Post-anesthesia assessment completed.  No concerns    Signed By: Hubert Gottron, DO     June 11, 2018

## 2018-06-11 NOTE — PROCEDURES
[facility]                   Endoscopic Gastroduodenoscopy Procedure Note      6/11/2018  Manuela Dodson  9/28/1933  [id number]    Procedure: Endoscopic Gastroduodenoscopy with biopsy    Indication:  Iron deficiency anemia, Occult blood in stool with possible UGI origin     Pre-operative Diagnosis: see indication above    Post-operative Diagnosis: see findings below    : NEYDA Disla MD    Referring Provider:  Lotus Rueda MD      Anesthesia/Sedation:  MAC anesthesia Propofol    Airway assessment: No airway problems anticipated    Pre-Procedural Exam:      Airway: clear, no airway problems anticipated  Heart: RRR, without gallops or rubs  Lungs: clear bilaterally without wheezes, crackles, or rhonchi  Abdomen: soft, nontender, nondistended, bowel sounds present  Mental Status: awake, alert and oriented to person, place and time       Procedure Details     After infomed consent was obtained for the procedure, with all risks and benefits of procedure explained the patient was taken to the endoscopy suite and placed in the left lateral decubitus position. Following sequential administration of sedation as per above, the endoscope was inserted into the mouth and advanced under direct vision to third portion of the duodenum. A careful inspection was made as the gastroscope was withdrawn, including a retroflexed view of the proximal stomach; findings and interventions are described below. Findings:   Esophagus:normal mucosa  Stomach: mild erythema, biopsied for pyloritek  Duodenum: normal mucosa, biopsied    Therapies:  biopsy of duodenal second portion    Specimens: duodenal biopsies           Complications:   None; patient tolerated the procedure well. EBL:  None. Impression:      -Normal esophagus  -Mild gastritis  -Normal duodenum    Recommendations:  -Await pathology. , -Await pyloritek test result and treat for Helicobacter pylori if positive. , -Proceed with colonoscopy    Kevin Vergara MD6/11/2018

## 2018-06-11 NOTE — PROCEDURES
[facility]                  Colonoscopy Operative Report    6/11/2018      Fer Clark  [id number]  9/28/1933    Procedure Type:   Colonoscopy     Indications:    Iron deficiency anemia, Occult blood in stool     Pre-operative Diagnosis: see indication above    Post-operative Diagnosis:  See findings below    :  Tammy Marx MD    Referring Provider: Minoo Alston MD      Sedation:  MAC anesthesia Propofol    Pre-Procedural Exam:      Airway: clear,  No airway problems anticipated  Heart: RRR, without gallops or rubs  Lungs: clear bilaterally without wheezes, crackles, or rhonchi  Abdomen: soft, nontender, nondistended, bowel sounds present  Mental Status: awake, alert and oriented to person, place and time     Procedure Details:  After informed consent was obtained with all risks and benefits of procedure explained and preoperative exam completed, the patient was taken to the endoscopy suite and placed in the left lateral decubitus position. Upon sequential sedation as per above, a digital rectal exam was performed . The Olympus videocolonoscope  was inserted in the rectum and carefully advanced to the cecum, which was identified by the ileocecal valve and appendiceal orifice. The cecum was identified by the ileocecal valve and appendiceal orifice. The quality of preparation was good. The colonoscope was slowly withdrawn with careful evaluation between folds. Retroflexion in the rectum was completed demonstrating internal hemorrhoids. Findings:   Rectum: Grade 1 internal hemorrhoid(s); Sigmoid: normal  Descending Colon: normal  Transverse Colon: normal  Ascending Colon: normal  Cecum: normal  Terminal Ileum: unable to enter      Specimen Removed:  none    Complications: None. EBL:  None.     Impression:    normal colonic mucosa throughout  hemorrhoids internal, Small in size   Unable to enter terminal ileum    Recommendations: --Follow CBC---if anemia persists then will schedule Pillcam of the small bowel. Regular diet. Resume normal medication(s). Discharge Disposition:  Home in the company of a  when able to ambulate. Tanvi Mohan MD    6/11/2018     NEYDA Dumont MD  [facility], [address] [place] [address]  [phone]  [email or web address]

## 2018-06-11 NOTE — IP AVS SNAPSHOT
[address] [facility] 
[phone] Patient: Rach Chan MRN: [id number] About your hospitalization You were admitted on:  June 11, 2018 You last received care in the:  [facility] ENDOSCOPY You were discharged on:  June 11, 2018 Why you were hospitalized Your primary diagnosis was:  Not on File Follow-up Information Follow up With Details Comments Contact Info MD Viktor Doe Dr 
[facility] [phone] 91 [address] [place] [address] 
[phone] Discharge Orders None A check leola indicates which time of day the medication should be taken. My Medications CONTINUE taking these medications Instructions Each Dose to Equal  
 Morning Noon Evening Bedtime  
 acetaminophen 325 mg tablet Commonly known as:  TYLENOL Your last dose was: Your next dose is: Take 2 Tabs by mouth four (4) times daily. 650 mg  
    
   
   
   
  
 aspirin 81 mg tablet Your last dose was: Your next dose is: Take 81 mg by mouth. 81 mg  
    
   
   
   
  
 calcium-cholecalciferol (D3) tablet Commonly known as:  CALTRATE 600+D Your last dose was: Your next dose is: Take 1 Tab by mouth two (2) times a day. 1 Tab Iron 325 mg (65 mg iron) tablet Generic drug:  ferrous sulfate Your last dose was: Your next dose is: Take  by mouth two (2) times a day. LIPITOR 20 mg tablet Generic drug:  atorvastatin Your last dose was: Your next dose is: Take 20 mg by mouth daily. 20 mg  
    
   
   
   
  
 magnesium oxide 400 mg tablet Commonly known as:  MAG-OX Your last dose was: Your next dose is: Take 400 mg by mouth two (2) times a day. 400 mg  
    
   
   
   
  
 multivitamin tablet Commonly known as:  ONE A DAY Your last dose was: Your next dose is: Take 1 Tab by mouth daily. 1 Tab  
    
   
   
   
  
 predniSONE 5 mg tablet Commonly known as:  Glenna Fishman Your last dose was: Your next dose is: Take  by mouth daily. PROGRAF 1 mg capsule Generic drug:  tacrolimus Your last dose was: Your next dose is: Take  by mouth two (2) times a day. Indications: 1mg in am, 1.5 mg in pm  
     
   
   
   
  
 propranolol 10 mg tablet Commonly known as:  INDERAL Your last dose was: Your next dose is: Take 20 mg by mouth two (2) times a day. 20 mg PROTONIX 40 mg tablet Generic drug:  pantoprazole Your last dose was: Your next dose is: Take 40 mg by mouth daily. 40 mg  
    
   
   
   
  
 SEPTRA PO Your last dose was: Your next dose is: Take  by mouth daily. Indications: only on Monday and Friday  
     
   
   
   
  
 traMADol 50 mg tablet Commonly known as:  ULTRAM  
   
Your last dose was: Your next dose is: Take 1-2 Tabs by mouth every six (6) hours as needed. Max Daily Amount: 400 mg.  
  mg  
    
   
   
   
  
 VALCYTE 450 mg tablet Generic drug:  valGANciclovir Your last dose was: Your next dose is: Take 450 mg by mouth daily. Indications: takes on sunday and thursday 450 mg  
    
   
   
   
  
 ZyrTEC 10 mg tablet Generic drug:  cetirizine Your last dose was: Your next dose is: Take 10 mg by mouth daily as needed for Allergies. 10 mg Opioid Education  Prescription Opioids: What You Need to Know: 
 
 
Ana Mendez [id number] 9/28/1933 EGD DISCHARGE INSTRUCTIONS Discomfort: 
Sore throat- throat lozenges or warm salt water gargle 
redness at IV site- apply warm compress to area; if redness or soreness persist- contact your physician Gaseous discomfort- walking, belching will help relieve any discomfort You may not operate a vehicle for 12 hours You may not engage in an occupation involving machinery or appliances for rest of today You may not drink alcoholic beverages for at least 12 hours Avoid making any critical decisions for at least 24 hour DIET You may have anything by mouth. You may eat and drink immediately. You may resume your regular diet  however -  remember your colon is empty and a heavy meal will produce gas. Avoid these foods:  vegetables, fried / greasy foods, carbonated drinks ACTIVITY You may resume your normal daily activities Spend the remainder of the day resting -  avoid any strenuous activity. CALL M.D. ANY SIGN OF Increasing pain, nausea, vomiting Abdominal distension (swelling) New increased bleeding (oral or rectal) Fever (chills) Pain in chest area Bloody discharge from nose or mouth Shortness of breath Follow-up Instructions: 
 Call Dr. Miguel A Whitehead for any questions or problems. Telephone # [phone] Dr. Snow Fernandez office will notify you of the biopsy results available  Within 7 to 10 days. We will call you or send a letter Continue same medications. ENDOSCOPY FINDINGS: 
 Your endoscopy showed only mild gastritis which was biopsied. Also biopsied the small bowel to check for celiac sprue. DISCHARGE SUMMARY from Nurse The following personal items collected during your admission are returned to you:  
Dental Appliance: Dental Appliances: Lowers, Uppers Vision: Visual Aid: Contacts Hearing Aid:   
Jewelry:   
Clothing:   
Other Valuables:   
Valuables sent to safe:    
 
9/28/1933 COLON DISCHARGE INSTRUCTIONS Discomfort: 
Redness at IV site- apply warm compress to area; if redness or soreness persist- contact your physician There may be a slight amount of blood passed from the rectum Gaseous discomfort- walking, belching will help relieve any discomfort You may not operate a vehicle for 12 hours You may not engage in an occupation involving machinery or appliances for rest of today You may not drink alcoholic beverages for at least 12 hours Avoid making any critical decisions for at least 24 hour DIET: 
 Regular diet.  however -  remember your colon is empty and a heavy meal will produce gas. Avoid these foods:  vegetables, fried / greasy foods, carbonated drinks for today ACTIVITY: 
You may resume your normal daily activities it is recommended that you spend the remainder of the day resting -  avoid any strenuous activity. CALL M.D. ANY SIGN OF: Increasing pain, nausea, vomiting Abdominal distension (swelling) New increased bleeding (oral or rectal) Fever (chills) Pain in chest area Bloody discharge from nose or mouth Shortness of breath COLONOSCOPY FINDINGS: 
Your colonoscopy showed: no polyps, just some internal hemorrhoids. Follow-up Instructions: 
 Call Dr. Cassy Martinez if any questions or problems. Telephone # [phone] If anemia persists then will schedule a Pillcam to check the small bowel. Introducing [facility]! Sherice Lemos introduces [employer] patient portal. Now you can access parts of your medical record, email your doctor's office, and request medication refills online.    
 
1. In your internet browser, go to [email or web address] 2. Click on the First Time User? Click Here link in the Sign In box. You will see the New Member Sign Up page. 3. Enter your Actito Access Code exactly as it appears below. You will not need to use this code after youve completed the sign-up process. If you do not sign up before the expiration date, you must request a new code. · Actito Access Code: [email or web address] Expires: 8/2/2018  1:34 PM 
 
4. Enter the last four digits of your Social Security Number (xxxx) and Date of Birth (mm/dd/yyyy) as indicated and click Submit. You will be taken to the next sign-up page. 5. Create a Kiyont ID. This will be your Actito login ID and cannot be changed, so think of one that is secure and easy to remember. 6. Create a Actito password. You can change your password at any time. 7. Enter your Password Reset Question and Answer. This can be used at a later time if you forget your password. 8. Enter your e-mail address. You will receive e-mail notification when new information is available in [address] 9. Click Sign Up. You can now view and download portions of your medical record. 10. Click the Download Summary menu link to download a portable copy of your medical information. If you have questions, please visit the Frequently Asked Questions section of the Actito website. Remember, Actito is NOT to be used for urgent needs. For medical emergencies, dial 911. Now available from your iPhone and Android! Introducing Mckinley Gonzales As a Dorothy Johnson patient, I wanted to make you aware of our electronic visit tool called Mckinley Gonzales. [facility] Johnson 24/7 allows you to connect within minutes with a medical provider 24 hours a day, seven days a week via a mobile device or tablet or logging into a secure website from your computer. You can access Mckinley Gonzales from anywhere in the [place]. A virtual visit might be right for you when you have a simple condition and feel like you just dont want to get out of bed, or cant get away from work for an appointment, when your regular Hyacinth Santoss provider is not available (evenings, weekends or holidays), or when youre out of town and need minor care. Electronic visits cost only $49 and if the Street[facility]s 24/7 provider determines a prescription is needed to treat your condition, one can be electronically transmitted to a nearby pharmacy*. Please take a moment to enroll today if you have not already done so. The enrollment process is free and takes just a few minutes. To enroll, please download the [employer] delvin to your tablet or phone, or visit [email or web address] to enroll on your computer. And, as an [address] [place] [address] patient with a [employer] account, the results of your visits will be scanned into your electronic medical record and your primary care provider will be able to view the scanned results. We urge you to continue to see your regular Hyacinth Santoss provider for your ongoing medical care. And while your primary care provider may not be the one available when you seek a Mckinley [employer]jesus virtual visit, the peace of mind you get from getting a real diagnosis real time can be priceless. For more information on Mckinley Gonzales, view our Frequently Asked Questions (FAQs) at [email or web address]. Sincerely, 
 
Gricelda Bergman MD 
Chief Medical Officer [facility] Adriana Gino *:  certain medications cannot be prescribed via Mckinley Gonzales Providers Seen During Your Hospitalization Provider Specialty Primary office phone Mayur Brown MD Gastroenterology [phone] Your Primary Care Physician (PCP) Primary Care Physician Office Phone Office Fax Yoni Watkins [phone] You are allergic to the following Allergen Reactions Adhesive Other (comments)  
 blisters Cefdinir Other (comments) Felt bad Levaquin (Levofloxacin) Other (comments) Fatigue, could not tolerate Nsaids (Non-Steroidal Anti-Inflammatory Drug) Other (comments) Due to lung transplant Other Medication Other (comments) Posaconsole. Can not remember reaction Primidone Other (comments) Feel bad Voriconazole Other (comments) Reacts with antirejection medications. Recent Documentation Height Weight BMI Smoking Status 1.778 m 71.2 kg 22.53 kg/m2 Never Smoker Emergency Contacts Name Discharge Info Relation Home Work Mobile Serena Albert DISCHARGE CAREGIVER [3] Spouse [3]   [phone] Patient Belongings The following personal items are in your possession at time of discharge: 
  Dental Appliances: Lowers, Uppers  Visual Aid: Contacts Please provide this summary of care documentation to your next provider. Signatures-by signing, you are acknowledging that this After Visit Summary has been reviewed with you and you have received a copy. Patient Signature:  ____________________________________________________________ Date:  ____________________________________________________________  
  
Yvonne Sen Provider Signature:  ____________________________________________________________ Date:  ____________________________________________________________

## 2018-06-11 NOTE — DISCHARGE INSTRUCTIONS
Jose Sandoval MD  [facility], [address]  [place], [address] [facility]  [phone]  [email or web address]    Nory Listen  [id number]  9/28/1933    EGD DISCHARGE INSTRUCTIONS    Discomfort:  Sore throat- throat lozenges or warm salt water gargle  redness at IV site- apply warm compress to area; if redness or soreness persist- contact your physician  Gaseous discomfort- walking, belching will help relieve any discomfort  You may not operate a vehicle for 12 hours  You may not engage in an occupation involving machinery or appliances for rest of today  You may not drink alcoholic beverages for at least 12 hours  Avoid making any critical decisions for at least 24 hour  DIET  You may have anything by mouth. You may eat and drink immediately. You may resume your regular diet - however -  remember your colon is empty and a heavy meal will produce gas. Avoid these foods:  vegetables, fried / greasy foods, carbonated drinks      ACTIVITY  You may resume your normal daily activities   Spend the remainder of the day resting -  avoid any strenuous activity. CALL M.D. ANY SIGN OF   Increasing pain, nausea, vomiting  Abdominal distension (swelling)  New increased bleeding (oral or rectal)  Fever (chills)  Pain in chest area  Bloody discharge from nose or mouth  Shortness of breath    Follow-up Instructions:   Call Dr. Jose Sandoval for any questions or problems. Telephone # [phone]  Dr. Maribel Berg office will notify you of the biopsy results available  Within 7 to 10 days. We will call you or send a letter   Continue same medications. ENDOSCOPY FINDINGS:   Your endoscopy showed only mild gastritis which was biopsied. Also biopsied the small bowel to check for celiac sprue.       DISCHARGE SUMMARY from Nurse    The following personal items collected during your admission are returned to you:   Dental Appliance: Dental Appliances: Lowers, Uppers  Vision: Visual Aid: Contacts  Hearing Aid:    Jewelry:    Clothing:    Other Valuables:    Valuables sent to safe:       9/28/1933    COLON DISCHARGE INSTRUCTIONS  Discomfort:  Redness at IV site- apply warm compress to area; if redness or soreness persist- contact your physician  There may be a slight amount of blood passed from the rectum  Gaseous discomfort- walking, belching will help relieve any discomfort  You may not operate a vehicle for 12 hours  You may not engage in an occupation involving machinery or appliances for rest of today  You may not drink alcoholic beverages for at least 12 hours  Avoid making any critical decisions for at least 24 hour  DIET:   Regular diet. - however -  remember your colon is empty and a heavy meal will produce gas. Avoid these foods:  vegetables, fried / greasy foods, carbonated drinks for today      ACTIVITY:  You may resume your normal daily activities it is recommended that you spend the remainder of the day resting -  avoid any strenuous activity. CALL M.D. ANY SIGN OF:   Increasing pain, nausea, vomiting  Abdominal distension (swelling)  New increased bleeding (oral or rectal)  Fever (chills)  Pain in chest area  Bloody discharge from nose or mouth  Shortness of breath     COLONOSCOPY FINDINGS:  Your colonoscopy showed: no polyps, just some internal hemorrhoids. Follow-up Instructions:   Call Dr. Vi Alvarado if any questions or problems. Telephone # [phone]  If anemia persists then will schedule a Pillcam to check the small bowel.

## 2018-06-11 NOTE — PROGRESS NOTES
Endoscope was pre-cleaned at the bedside immediately following procedure by Sara Montague.  (EGD scope)

## 2018-07-07 NOTE — ANESTHESIA PREPROCEDURE EVALUATION
Anesthetic History          Comments: Pt reports he thinks he had memory loss with long surgery using propofol;  he reports no probs with memory after previous endoscopy/bronchoscopy procedures using propofol     Review of Systems / Medical History  Patient summary reviewed, nursing notes reviewed and pertinent labs reviewed    Pulmonary                Comments: S/P lung transplant---Left lung  Pulmonary fibrosis rt lung   Neuro/Psych   Within defined limits           Cardiovascular            Dysrhythmias (cardioverted) : atrial fibrillation  CAD and hyperlipidemia    Exercise tolerance: >4 METS  Comments: antirejection meds taken today     3-5-18 EKG:  NSR, nonspec ST/T wave changes   GI/Hepatic/Renal         Renal disease: CRI      Comments: Anemia, Endo/Other        Cancer (BCCa and SCCa)     Other Findings            Physical Exam    Airway  Mallampati: II  TM Distance: 4 - 6 cm  Neck ROM: normal range of motion   Mouth opening: Normal     Cardiovascular    Rhythm: regular  Rate: normal         Dental    Dentition: Edentulous     Pulmonary  Breath sounds clear to auscultation               Abdominal  GI exam deferred       Other Findings            Anesthetic Plan    ASA: 4  Anesthesia type: total IV anesthesia          Induction: Intravenous  Anesthetic plan and risks discussed with: Patient      Pt took beta blker and antirejection meds this am on schedule Vascular dementia without behavioral disturbance

## 2018-11-07 ENCOUNTER — HOSPITAL ENCOUNTER (OUTPATIENT)
Dept: MRI IMAGING | Age: 83
Discharge: HOME | End: 2018-11-07
Attending: ORTHOPAEDIC SURGERY
Payer: MEDICARE

## 2018-11-07 DIAGNOSIS — M25.511 RIGHT SHOULDER PAIN: ICD-10-CM

## 2018-11-07 PROCEDURE — 73221 MRI JOINT UPR EXTREM W/O DYE: CPT

## (undated) DEVICE — 3M™ COBAN™ NL STERILE NON-LATEX SELF-ADHERENT WRAP, 2086S, 6 IN X 5 YD (15 CM X 4,5 M), 12 ROLLS/CASE: Brand: 3M™ COBAN™

## (undated) DEVICE — TOWEL 4 PLY TISS 19X30 SUE WHT

## (undated) DEVICE — 3 BONE CEMENT MIXER: Brand: MIXEVAC

## (undated) DEVICE — GOWN,SIRUS,NONRNF,SETINSLV,XL,20/CS: Brand: MEDLINE

## (undated) DEVICE — SOLIDIFIER MEDC 1200ML -- CONVERT TO 356117

## (undated) DEVICE — BAG SPEC BIOHZRD 10 X 10 IN --

## (undated) DEVICE — (D)ADHESIVE TISS HI VISC 1ML -- DISC USE ITEM 346585

## (undated) DEVICE — Device

## (undated) DEVICE — Z DISCONTINUED PER MEDLINE LINE GAS SAMPLING O2/CO2 LNG AD 13 FT NSL W/ TBNG FILTERLINE

## (undated) DEVICE — KENDALL RADIOLUCENT FOAM MONITORING ELECTRODE RECTANGULAR SHAPE: Brand: KENDALL

## (undated) DEVICE — SUTURE PDS + SZ 1 L96IN ABSRB VLT L65MM TP-1 1/2 CIR PDP880G

## (undated) DEVICE — PACK PROCEDURE SURG TOT KNEE CUST

## (undated) DEVICE — 1200 GUARD II KIT W/5MM TUBE W/O VAC TUBE: Brand: GUARDIAN

## (undated) DEVICE — T-DRAPE,EXTREMITY,STERILE: Brand: MEDLINE

## (undated) DEVICE — SUTURE VCRL 2-0 L27IN ABSRB UD OS-6 L36MM 1/2 CIR REV CUT J533H

## (undated) DEVICE — Z CONVERTED USE 2271386 BANDAGE COMPR W6INXL11YD WVN COTTON/ELASTIC CLP CLSR DBL

## (undated) DEVICE — DRSG FOAM MEPILX PST OP 4X12IN --

## (undated) DEVICE — NEONATAL-ADULT SPO2 SENSOR: Brand: NELLCOR

## (undated) DEVICE — MEDI-VAC YANK SUCT HNDL W/TPRD BULBOUS TIP: Brand: CARDINAL HEALTH

## (undated) DEVICE — BIPOLAR SEALER 23-301-1 AQM MBS: Brand: AQUAMANTYS™

## (undated) DEVICE — DEVON™ KNEE AND BODY STRAP 60" X 3" (1.5 M X 7.6 CM): Brand: DEVON

## (undated) DEVICE — ANTI-EMBOLISM STOCKINGS,THIGH LENGTH WITH BELT,X-LARGE,LONG,SIZE H+: Brand: T.E.D.

## (undated) DEVICE — (D)PREP SKN CHLRAPRP APPL 26ML -- CONVERT TO ITEM 371833

## (undated) DEVICE — BLADE SAW 1.27X90 MM FOR LG BNE [EZ2513PM62STE] [KOMET MEDICAL]

## (undated) DEVICE — CONVERTORS STOCKINETTE: Brand: CONVERTORS

## (undated) DEVICE — SELF ADHERENT BANDAGE: Brand: DEROYAL

## (undated) DEVICE — SOLUTION SCRB 4OZ 10% PVP I POVIDONE IOD TOP PAINT EXIDINE

## (undated) DEVICE — SOL IRRIGATION INJ NACL 0.9% 500ML BTL

## (undated) DEVICE — CONCISE CEMENT SCULPS KIT: Brand: CONCISE

## (undated) DEVICE — SYR 10ML LUER LOK 1/5ML GRAD --

## (undated) DEVICE — BASIN EMSIS 16OZ GRAPHITE PLAS KID SHP MOLD GRAD FOR ORAL

## (undated) DEVICE — SOLUTION IRRIG 3000ML LAC R FLX CONT

## (undated) DEVICE — CATH IV AUTOGRD BC PNK 20GA 25 -- INSYTE

## (undated) DEVICE — CONTAINER SPEC 20 ML LID NEUT BUFF FORMALIN 10 % POLYPR STS

## (undated) DEVICE — STERILE POLYISOPRENE POWDER-FREE SURGICAL GLOVES: Brand: PROTEXIS

## (undated) DEVICE — REM POLYHESIVE ADULT PATIENT RETURN ELECTRODE: Brand: VALLEYLAB

## (undated) DEVICE — SYR 3ML LL TIP 1/10ML GRAD --

## (undated) DEVICE — RIMMED SPEED PIN 45MM STERILE

## (undated) DEVICE — NEEDLE HYPO 18GA L1.5IN PNK S STL HUB POLYPR SHLD REG BVL

## (undated) DEVICE — FORCEPS BX L240CM JAW DIA2.8MM L CAP W/ NDL MIC MESH TOOTH

## (undated) DEVICE — NEEDLE HYPO 18GA L1.5IN PNK POLYPR HUB S STL THN WALL FILL

## (undated) DEVICE — ABDOMINAL PAD: Brand: DERMACEA

## (undated) DEVICE — 4-PORT MANIFOLD: Brand: NEPTUNE 2

## (undated) DEVICE — SYR LR LCK 1ML GRAD NSAF 30ML --

## (undated) DEVICE — NON RIMMED SPEED PIN 65MM STERILE

## (undated) DEVICE — SYSTEM SKIN CLSR 22CM DERMBND PRINEO

## (undated) DEVICE — SET ADMIN 16ML TBNG L100IN 2 Y INJ SITE IV PIGGY BK DISP

## (undated) DEVICE — BLOCK BITE ENDOSCP AD 21 MM W/ DIL BLU LF DISP

## (undated) DEVICE — DISPOSABLE TOURNIQUET CUFF SINGLE BLADDER, SINGLE PORT AND QUICK CONNECT CONNECTOR: Brand: COLOR CUFF

## (undated) DEVICE — CATHETERIZATION TRAY 16 FR FOL DRAINAGE BG LUBRI-SIL IC

## (undated) DEVICE — STERILE POLYISOPRENE POWDER-FREE SURGICAL GLOVES WITH EMOLLIENT COATING: Brand: PROTEXIS